# Patient Record
Sex: FEMALE | Race: WHITE
[De-identification: names, ages, dates, MRNs, and addresses within clinical notes are randomized per-mention and may not be internally consistent; named-entity substitution may affect disease eponyms.]

---

## 2017-01-31 ENCOUNTER — HOSPITAL ENCOUNTER (OUTPATIENT)
Dept: HOSPITAL 45 - C.MED | Age: 62
Setting detail: OBSERVATION
LOS: 1 days | Discharge: HOME | End: 2017-02-01
Attending: INTERNAL MEDICINE | Admitting: INTERNAL MEDICINE
Payer: SELF-PAY

## 2017-01-31 VITALS
HEIGHT: 58 IN | HEIGHT: 58 IN | BODY MASS INDEX: 42.02 KG/M2 | BODY MASS INDEX: 42.02 KG/M2 | WEIGHT: 200.18 LBS | WEIGHT: 200.18 LBS

## 2017-01-31 VITALS
DIASTOLIC BLOOD PRESSURE: 81 MMHG | OXYGEN SATURATION: 96 % | SYSTOLIC BLOOD PRESSURE: 144 MMHG | TEMPERATURE: 97.88 F | HEART RATE: 63 BPM

## 2017-01-31 DIAGNOSIS — E11.9: ICD-10-CM

## 2017-01-31 DIAGNOSIS — Z83.3: ICD-10-CM

## 2017-01-31 DIAGNOSIS — R07.89: Primary | ICD-10-CM

## 2017-01-31 DIAGNOSIS — Z95.5: ICD-10-CM

## 2017-01-31 DIAGNOSIS — Z82.49: ICD-10-CM

## 2017-01-31 DIAGNOSIS — Z88.2: ICD-10-CM

## 2017-01-31 DIAGNOSIS — Z79.4: ICD-10-CM

## 2017-01-31 DIAGNOSIS — Z88.0: ICD-10-CM

## 2017-01-31 DIAGNOSIS — Z79.899: ICD-10-CM

## 2017-01-31 DIAGNOSIS — J81.1: ICD-10-CM

## 2017-01-31 DIAGNOSIS — Z87.891: ICD-10-CM

## 2017-01-31 DIAGNOSIS — I25.2: ICD-10-CM

## 2017-01-31 DIAGNOSIS — E78.5: ICD-10-CM

## 2017-01-31 DIAGNOSIS — I25.10: ICD-10-CM

## 2017-01-31 DIAGNOSIS — Z95.1: ICD-10-CM

## 2017-01-31 DIAGNOSIS — Z88.5: ICD-10-CM

## 2017-01-31 DIAGNOSIS — Z79.01: ICD-10-CM

## 2017-01-31 DIAGNOSIS — I10: ICD-10-CM

## 2017-01-31 DIAGNOSIS — J44.9: ICD-10-CM

## 2017-01-31 RX ADMIN — METOPROLOL TARTRATE SCH MG: 50 TABLET, FILM COATED ORAL at 21:00

## 2017-01-31 NOTE — HISTORY & PHYSICAL EXAMINATION
DATE OF ADMISSION:  2017

 

PRIMARY CARE PHYSICIAN:  Dr. Chandra.

 

CHIEF COMPLAINT:  Chest pain.

 

HISTORY OF PRESENT ILLNESS:  Ms. Ovalles is a pleasant 61-year-old woman with a

history of severe coronary artery disease status post CABG and multiple prior

PCIs with stents, known to me; who returns from outside hospital ED with

chest pain.

 

The patient has a long cardiac history as discussed below.  She was recently

admitted to [facility] on 2 occasions 2 weeks ago.  Initial

hospitalization occurred in the setting of an NSTEMI when presented with a

troponin that peaked at approximately 3.6.  She underwent cardiac

catheterization at that time which showed a new 99% lesion in her OM1 distal to

previously placed stents.  She underwent PCTA

with a reasonable result but stent could not be placed due to difficult

anatomy.  Post-procedure, she was chest pain free, and was discharged to

home.  She returned the subsequent day with COPD

exacerbation.  She was treated with bronchodilators, steroids and

antibiotics, and discharged to home.  Since that time, she has largely been

well.  She has been walking around her home and outside without significant

chest discomfort.  Although she does feel that she is more weak than before.

 

Today, the day of admission, patient was at home, watching TV when felt a hot

burning sensation starting in her epigastric region which extended up towards

her head.  This was different than the pain that she has had before.  She was

concerned, took 1 nitroglycerin with minimal relief and called the EMS.  Upon

arriving in the [facility] ED, she received 1 dose of morphine and

her chest pain was resolved.  She had an EKG there, which showed normal sinus

rhythm with anterolateral T-wave inversions, unchanged from prior EKGs.  

She had 2 troponins that were negative.  As cardiology was not available at 

[place] she was transferred back here for further management.

 

At the time of arrival, patient was comfortable.  She denies any recurrent

chest pain.  She denies any shortness of breath, palpitations or presyncope. 

No other new complaints.

 

PAST MEDICAL HISTORY:

1.  Coronary artery disease.  The patient underwent prior 3-vessel bypass

surgery with LIMA to LAD, vein graft to ramus, vein graft to OM1.  She had an

NSTEMI back in 2016, at which point she was noted to have severe

OM1 and ramus disease with occluded vein graft to ramus and occluded vein

graft to circumflex.  She was transferred to [facility] where Dr. Mathews

placed drug-eluting stents to her OM, ramus and distal left main into her

proximal circumflex.  Post stents patient was

largely chest pain free until returned back to [facility] in 2017.

 There had a PTCA of OM1 with reasonable result.

2.  Type 2 diabetes.

3.  Hypertension.

4.  Prior tobacco abuse.

5.  Hyperlipidemia.

 

PAST SURGICAL HISTORY:

1.  Prior cardiac surgery as above.

2.  Status post hysterectomy.

3.  Status post appendectomy.

4.  Status post cholecystectomy.

5.  Status post tubal ligation.

 

FAMILY HISTORY:  She has a sister and a daughter both with diabetes.  Her

father and mother both had heart disease as well as her brother.  Mother 

of cancer at age 63.

 

SOCIAL HISTORY:  Long-term smoker, quit after her last hospitalization. 

Denies significant alcohol use.  Currently out of work.  Previously was a bus

.  Lives near [place].

 

REVIEW OF SYSTEMS:  A 10-point review of systems is completed and otherwise

negative unless stated in the HPI.

 

PHYSICAL EXAMINATION:

VITAL SIGNS:  The patient is afebrile.  Blood pressure 144/84, heart rate was

within normal range.

GENERAL:  The patient appears comfortable, in no acute distress.

HEENT:  Sclerae are anicteric.  Oropharynx is clear.  Mucous membranes are

moist.

NECK:  Supple, no lymphadenopathy.

LUNGS:  Clear to auscultation.  She had reasonable air movement with no

significant wheezing.

CARDIAC:  She had a regular rate and rhythm with no appreciable murmurs, rubs

or gallops.

ABDOMEN:  Soft, nontender, nondistended with positive bowel sounds.

EXTREMITIES:  Warm.  She had trace lower extremity edema.  She had intact

distal pulses.

SKIN:  Showed no rashes or lesions.

NEUROLOGIC:  Cranial nerves II-XII are grossly intact.

PSYCHIATRIC:  She was alert and oriented x3 and mood and was appropriate.

 

LABORATORY DATA:  At outside hospital showed white blood cell count 6.8,

hemoglobin of 12.4, platelets of 195.  Her INR was 0.96.  Her initial

troponin was 0.02.  Subsequent troponin was 0.022.  Sodium was 137, potassium

4.4, BUN 22, creatinine 1.2.  LFTs were within normal limits.

 

Chest x-ray showed cardiomegaly with mild edema.

 

EKG showed sinus bradycardia with anterior septal infarct and ST-T wave

abnormality, potentially consistent with anterolateral ischemia, unchanged

from prior.

 

IMPRESSION AND PLAN:

1.  Chest pain.  The patient is here with atypical chest pain, unlikely to be 
cardiac

in origin.  Cardiac enzymes have been negative x2.  EKG is unchanged.  Only

residual chest pain at this time, likely musculoskeletal.  We will continue

to treat conservatively.  We will repeat EKG tonight repeat cardiac enzymes

overnight.  Assuming cardiac enzymes remain unremarkable and patient remains

largely chest pain free, potentially could be discharged tomorrow with

cardiology followup.

2.  History of severe coronary artery disease.  Continue patient's

prior aspirin, Plavix, beta blocker, high dose statin.  She will need

outpatient followup and at that time will consider stress test if recurrent pain

to assess ischemic burden.

3.  Mild pulmonary edema.  We will resume patient on prior thiazide diuretic.

4.  Diabetes.  We will continue home diabetic regimen with insulin 70/30 and

Accu-Cheks.

5.  Chronic bronchitis.  Symptoms slowly resolving.  We will continue home

bronchodilators.

6.  Prophylaxis.  We will start her on subQ Lovenox.

 

DISPOSITION:  The patient likely discharged to home tomorrow assuming cardiac

enzymes and telemetry unremarkable.

 

 

 

MTDD

## 2017-02-01 VITALS
HEART RATE: 63 BPM | TEMPERATURE: 97.34 F | OXYGEN SATURATION: 96 % | SYSTOLIC BLOOD PRESSURE: 126 MMHG | DIASTOLIC BLOOD PRESSURE: 78 MMHG

## 2017-02-01 VITALS
DIASTOLIC BLOOD PRESSURE: 79 MMHG | TEMPERATURE: 97.7 F | HEART RATE: 60 BPM | OXYGEN SATURATION: 95 % | SYSTOLIC BLOOD PRESSURE: 124 MMHG

## 2017-02-01 VITALS — OXYGEN SATURATION: 96 %

## 2017-02-01 VITALS
OXYGEN SATURATION: 96 % | TEMPERATURE: 97.34 F | DIASTOLIC BLOOD PRESSURE: 78 MMHG | SYSTOLIC BLOOD PRESSURE: 126 MMHG | HEART RATE: 63 BPM

## 2017-02-01 VITALS
SYSTOLIC BLOOD PRESSURE: 100 MMHG | TEMPERATURE: 98.06 F | HEART RATE: 74 BPM | OXYGEN SATURATION: 94 % | DIASTOLIC BLOOD PRESSURE: 67 MMHG

## 2017-02-01 LAB
ANION GAP SERPL CALC-SCNC: 7 MMOL/L (ref 3–11)
BUN SERPL-MCNC: 19 MG/DL (ref 7–18)
BUN/CREAT SERPL: 17.3 (ref 10–20)
CALCIUM SERPL-MCNC: 8.9 MG/DL (ref 8.5–10.1)
CHLORIDE SERPL-SCNC: 109 MMOL/L (ref 98–107)
CO2 SERPL-SCNC: 28 MMOL/L (ref 21–32)
CREAT CL PREDICTED SERPL C-G-VRATE: 52.9 ML/MIN
CREAT SERPL-MCNC: 1.1 MG/DL (ref 0.6–1.2)
EOSINOPHIL NFR BLD AUTO: 151 K/UL (ref 130–400)
GLUCOSE SERPL-MCNC: 144 MG/DL (ref 70–99)
HCT VFR BLD CALC: 36.4 % (ref 37–47)
MCH RBC QN AUTO: 31.2 PG (ref 25–34)
MCHC RBC AUTO-ENTMCNC: 34.1 G/DL (ref 32–36)
MCV RBC AUTO: 91.5 FL (ref 80–100)
PMV BLD AUTO: 8.5 FL (ref 7.4–10.4)
POTASSIUM SERPL-SCNC: 4.1 MMOL/L (ref 3.5–5.1)
RBC # BLD AUTO: 3.98 M/UL (ref 4.2–5.4)
SODIUM SERPL-SCNC: 144 MMOL/L (ref 136–145)
WBC # BLD AUTO: 6.03 K/UL (ref 4.8–10.8)

## 2017-02-01 RX ADMIN — METOPROLOL TARTRATE SCH MG: 50 TABLET, FILM COATED ORAL at 08:39

## 2017-02-01 RX ADMIN — CYCLOBENZAPRINE HYDROCHLORIDE SCH MG: 10 TABLET, FILM COATED ORAL at 00:35

## 2017-02-01 RX ADMIN — CYCLOBENZAPRINE HYDROCHLORIDE SCH MG: 10 TABLET, FILM COATED ORAL at 08:39

## 2017-02-01 NOTE — DISCHARGE INSTRUCTIONS
Discharge Instructions


Admission


Reason for Admission:  Chest Pain





Discharge


Discharge Diagnosis / Problem:  Chest pain





Discharge Goals


Goal(s):  Decrease discomfort, Improve function





Activity Recommendations


Activity Limitations:  resume your previous activity


Lifting Limitations:  none


Exercise/Sports Limitations:  none


May Resume Sexual Activity:  when tolerated


Shower/Bathe:  no limitations


Driving or Machine Use:  no limitations





.





Instructions / Follow-Up


Instructions / Follow-Up


Follow-up with primary care in 1-2 weeks.





Follow-up with cardiology in 1 month





Current Hospital Diet


Patient's current hospital diet: AHA Diet (Heart Healthy), Diabetes Type 2 Diet





Discharge Diet


Recommended Diet:  AHA Diet (Heart Healthy), Diabetes Type 2 Diet





Procedures


Procedures Performed:  


None





Pending Studies


Studies pending at discharge:  no





Laboratory Results





 Hemoglobin A1c








Test


  1/11/17


10:15 Range/Units


 


 


Estimated Average Glucose 249   mg/dl


 


Hemoglobin A1c 10.3 H 4.5-5.6  %











Medical Emergencies








.


Who to Call and When:





Medical Emergencies:  If at any time you feel your situation is an emergency, 

please call 911 immediately.





.





Non-Emergent Contact


Non-Emergency issues call your:  Primary Care Provider, Cardiologist


Contact Number:  Cardiology  - Dr. Roman [phone]





.


.





"Provider Documentation" section prepared by Frank Roman.





VTE Core Measure


Inpt VTE Proph given/why not?:  Enoxaparin (Lovenox)SQ

## 2017-02-01 NOTE — DISCHARGE SUMMARY
Discharge Summary


Admission Date:


Jan 31, 2017 at 19:05


Discharge Date:  Feb 1, 2017


Discharge Disposition:  Home


Principal Diagnosis:  Chest Paini


Immunizations:  


   Have You Had Influenza Vaccine:  No


   History of Pneumococcal:  No


Procedures:


None


Consultations:


Cardiology


Medication Reconciliation


New Medications:  


Triamterene/Hctz (Dyazide 37.5MG/25MG)  Cap


1 CAP PO DAILY PRN for leg swelling for 30 Days, #30 CAP 3 Refills





Aspirin (Aspirin EC Low Dose) 81 Mg Ectab


81 MG PO QAM for 30 Days, #30 TAB 9 Refills





Pantoprazole (Pantoprazole Sodium) 40 Mg Tab


40 MG PO QAM for 30 Days, #30 TAB 9 Refills





 


Continued Medications:  


Albuterol Hfa (Ventolin Hfa) 200 Puffs/89724 Mcg Aers


2-4 PUFFS INH Q6H PRN for SOB/Wheezing, #1 INHALER





Atorvastatin (Lipitor) 40 Mg Tab


1 TAB PO HS for 90 Days, #90 TAB 3 Refills





Clopidogrel Bisulfate (Plavix) 75 Mg Tab


75 MG PO DAILY, #30 TAB


Take as directed.


Cyclobenzaprine Hcl (Flexeril) 10 Mg Tab


1 TAB PO TID for 10 Days, #30 TAB





Insulin Aspart 70/30 (Novolog Mix 70/30)  Susp


20 UNIT SC QAM, BTL





Insulin Aspart 70/30 (Novolog Mix 70/30)  Susp


12 UNIT SC QPM, BTL





Metoprolol Tartrate (Lopressor) 50 Mg Tab


1 TAB PO BID for 30 Days, #60 TAB


Take as directed


Nitroglycerin (Nitrostat) 0.4 Mg/1 Tab Subl


0.4 MG SL UD PRN for Chest Pain for 30 Days, #30 TAB


Take 1 tablet every 5 minutes for chest pain, up to 3 times. If pain


 not resolved call 911 or go to ER.


 


Discontinued Medications:  


Aspirin (Aspirin) 325 Mg Ectab


325 MG PO QAM for 30 Days, #30 TAB


Take as directed


Levofloxacin (Levofloxacin) 250 Mg Tab


250 MG PO DAILY for 5 Days, #5 TAB


Take for urinary tract infection starting 1/14/16, finish on 1/18/16.


Prednisone (Prednisone) 20 Mg Tab


2 TAB PO DAILY, #8 TAB











Discharge Exam


Physical Exam:  


   General Appearance:  WD/WN


   ENT:  hearing grossly normal, pharynx normal


   Neck:  supple, no JVD


   Respiratory/Chest:  lungs clear, normal breath sounds, no respiratory 

distress


   Cardiovascular:  regular rate, rhythm, no edema, no gallop, no JVD, + 

pertinent finding (Chest wall tender to palpation on left side lateral to 

sternum)


   Abdomen / GI:  normal bowel sounds, non tender, soft


   Extremities:  no pedal edema


   Neurologic/Psychiatric:  CNs II-XII nml as tested, alert, normal mood/affect


   Skin:  normal color, warm/dry, no rash





Hospital Course


Ms. Ovalles is a pleasant 61-year-old woman with a


history of severe coronary artery disease status post CABG and multiple prior


PCIs with stents who returned from outside hospital ED with


chest pain.





Patient with 2 recent hospitalizations at [facility].  Initially admitted with NSTEMI 

3 weeks ago.


Treated with PTCA of 99% OM1, stent could not be delivered.  Adequate POBA 

result.


Readmitted next day with COPD exacerbation.





Had been doing well until day of admission when developed burning epigastric 

pain which 


radiated up through her head.  Pain improved with 1 slntg at home, relieved 

completely with 


morphine in the ED.  Initial ECG, cardiac enzymes at [place] ED unremarkable.





Transferred to [facility] where cardiac enzymes remained negative, ecg/telemetry 

unremarkable.  She had mild left sided chest wall 


tenderness to palpation, worse with coughing but no other recurrent chest pain. 


Pain thought to be musculoskeletal vs possible GI and non-cardiac.  Will 

discharge on 


prior DAPT, and remainder of cardiac meds.  Added PPI and prior Dyazide for 

mild lower


extremity edema.





Patient will follow-up with her PCP in 1-2 weeks.


Follow-up with cardiology in 1-2 months.  If recurrent anginal pain in interim 

would consider stress test. 


Discussed case with Dr. Mathews at [facility].  May have additional 

interventional options if significant ischemia.


Total Time Spent:  Less than 30 minutes


This includes examination of the patient, discharge planning, medication 

reconciliation, and communication with other providers.





Discharge Instructions


Please refer to the electronic Patient Visit Report (Discharge Instructions) 

for additional information.





Follow-Up


PCP 1-2 weeks





Cardiology 1-2 months.

## 2017-02-01 NOTE — MEDICAL CONSULT
Consultation


Date of Consultation:


2017.


Attending Physician:


Ever Roman MD


History of Present Illness


60 y/o F w/Hx DM, HTN, HPL and severe CAD - transferred from OSH for cardiac 

evaluation due to ongoing CP.


She denies significant SOB, N/V, diaphoresis or radiation.  She is currently 

describing chest wall pain rather than substernal pain.





Past Medical/Surgical History


1) CAD


3V CABG (lima to LAD, v. to ramus, v. to OM1)


NSTEMI 2016 leading to cath and 2 stents due to graft occlusions


NSTEMI 2017 - PTCI of OM1





2) HTN





3) IDDM





4) Tobacco abuse - quit in last Mo





Family History





Diabetes mellitus


  SISTER, Onset:25's - 30 ( age 47)


  DAUGHTER, Onset:20's - 25


FH: brain cancer


  MOTHER ( age 63)


FH: heart attack


  FATHER


  MOTHER


FH: heart disease


  FATHER


  BROTHER


  BROTHER


Hypertension


  FATHER


  BROTHER ( age 67)


  BROTHER





Social History


Smoking Status:  Current Every Day Smoker


Drug Use:  none





Allergies


Coded Allergies:  


     Ampicillin (Verified  Allergy, Unknown, unknown, 17)


     Codeine (Verified  Allergy, Unknown, rash, 17)


     Sulfa Antibiotics (Verified  Allergy, Unknown, unknown, 17)





Current Inpatient Medications





 Current Inpatient Medications








 Medications


  (Trade)  Dose


 Ordered  Sig/Patrick


 Route  Start Time


 Stop Time Status Last Admin


Dose Admin


 


 Enoxaparin Sodium


  (Lovenox Inj)  40 mg  Q24H


 SC  17 06:00


 3/3/17 05:59   


 


 


 Acetaminophen


  (Tylenol Tab)  650 mg  Q4H  PRN


 PO  17 20:00


 3/2/17 19:59   


 


 


 Zolpidem Tartrate


  (Ambien Tab)  5 mg  HSZ  PRN


 PO  17 20:00


 3/2/17 19:59   


 


 


 Ondansetron HCl


  (Zofran Inj)  4 mg  Q6H  PRN


 IV  17 20:00


 3/2/17 19:59   


 


 


 Nitroglycerin


  (Nitrostat Tab)  0.4 mg  UD  PRN


 SL  17 20:00


 3/2/17 19:59   


 


 


 Albuterol


  (Ventolin Hfa


 Inhaler)  2 puffs  Q6H  PRN


 INH  17 20:00


 3/2/17 19:59   


 


 


 Atorvastatin


 Calcium


  (Lipitor Tab)  40 mg  HS


 PO  17 21:00


 3/2/17 20:59  17 00:30


40 MG


 


 Clopidogrel


 Bisulfate


  (plAVix TAB)  75 mg  DAILY


 PO  17 09:00


 3/3/17 08:59   


 


 


 Cyclobenzaprine


 HCl


  (Flexeril Tab)  10 mg  TID


 PO  17 21:00


 3/2/17 20:59  17 00:35


10 MG


 


 Insulin Aspart


 Prota 70%/Aspart


 30%


  (novoLOG MIX 70/


 30)  20 units  QAM


 SC  17 09:00


 3/3/17 08:59   


 


 


 Metoprolol


 Tartrate


  (Lopressor Tab)  50 mg  BID


 PO  17 21:00


 3/2/17 20:59   


 


 


 Aspirin


  (Ecotrin Tab)  81 mg  QAM


 PO  17 09:00


 3/3/17 08:59   


 


 


 Pantoprazole


 Sodium


  (Protonix Tab)  40 mg  QAM


 PO  17 09:00


 3/3/17 08:59   


 


 


 Miscellaneous


 Medication


  (Gi Cocktail)  24 ml  DAILY  PRN


 PO  17 20:00


 3/2/17 19:59 UNV  


 


 


 Insulin Aspart


 Prota 70%/Aspart


 30%


  (novoLOG MIX 70/


 30)  12 units  DAILY@1700


 SC  17 17:00


 3/3/17 16:59   


 











Review of Systems


Constitutional:  No chills, No fever, No sweats


Eyes:  No eye pain, No worsening of vision


ENT:  No hearing loss, No nasal symptoms, No unusual epistaxis


Respiratory:  No cough, No sputum, No wheezing


Cardiovascular:  + chest pain, No PND, No claudication, No edema, No orthopnea


Abdomen:  No nausea, No pain, No vomiting


Musculoskeletal:  No joint pain, No muscle pain


Genitourinary - Female:  No dysuria, No urinary frequency, No urinary urgency


Neurologic:  No memory loss, No paralysis, No weakness


Psychiatric:  No depression symptoms


Endocrine:  No fatigue


Hematologic / Lymphatic:  No abnormal bleeding/bruising


Integumentary:  No rash





Physical Exam











  Date Time  Temp Pulse Resp B/P Pulse Ox O2 Delivery O2 Flow Rate FiO2


 


17 00:26 36.7 74 20 100/67 94 Room Air  


 


17 00:05     96 Room Air  


 


1/31/17 19:00 36.6 63 18 144/81 96 Room Air  








General Appearance:  WD/WN, no apparent distress


Head:  normocephalic, atraumatic


Eyes:  normal inspection, PERRL, EOMI


ENT:  normal ENT inspection, pharynx normal


Neck:  supple, no adenopathy, thyroid normal, no JVD


Respiratory/Chest:  chest non-tender, lungs clear, normal breath sounds


Cardiovascular:  regular rate, rhythm, no edema, no gallop, no JVD, no murmur, 

normal peripheral pulses


Abdomen/GI:  normal bowel sounds, non tender, soft


Back:  normal inspection, no CVA tenderness, no muscle spasm, normal range of 

motion


Extremities/Musculoskelatal:  normal inspection, no calf tenderness, normal 

capillary refill, no pedal edema, normal range of motion


Neurologic/Psych:  CNs II-XII nml as tested, no motor/sensory deficits, alert, 

normal mood/affect, normal reflexes, oriented x 3


Skin:  normal color, warm/dry, no rash





Laboratory Results





Last 24 Hours








Test


  17


19:58 17


20:50


 


Creatine Kinase MB Ratio   


 


Creatine Kinase MB  < 0.5 ng/ml 


 


Troponin I  < 0.015 ng/ml 











Assessment & Plan


60 y/o F w/Hx DM, HTN, HPL and severe CAD - transferred from OSH for cardiac 

evaluation due to ongoing CP.








1) CP - pt was admitted by interventional cardiology - per admission there is 

no immediate plan for catheterization and she is being medically managed.  We 

will defer to cardiology for further testing.  Currently there has not been a 

troponin elevation to indicate an evolving MI and her pain is brought about by 

coughing appearing more as chest wall pain.  she will cont Plavix, ASA. statin. 

B blocker.





2) DM - placed on sliding scale





3) HTN - cont Metoprolol





4) HPL - Cont Atorvastatin








ttal time for this consult including chart review - review of attending notes, 

labs, prev cath report - 31 min

## 2018-08-17 ENCOUNTER — HOSPITAL ENCOUNTER (INPATIENT)
Dept: HOSPITAL 45 - C.2E | Age: 63
LOS: 7 days | Discharge: NURSING, REHAB OR LONG-TERM CARE | DRG: 286 | End: 2018-08-24
Attending: HOSPITALIST | Admitting: HOSPITALIST
Payer: COMMERCIAL

## 2018-08-17 VITALS
BODY MASS INDEX: 38.82 KG/M2 | BODY MASS INDEX: 38.82 KG/M2 | WEIGHT: 197.75 LBS | BODY MASS INDEX: 38.82 KG/M2 | HEIGHT: 60 IN | HEIGHT: 60 IN | WEIGHT: 197.75 LBS

## 2018-08-17 VITALS — OXYGEN SATURATION: 100 %

## 2018-08-17 VITALS
DIASTOLIC BLOOD PRESSURE: 51 MMHG | OXYGEN SATURATION: 96 % | TEMPERATURE: 97.52 F | HEART RATE: 90 BPM | SYSTOLIC BLOOD PRESSURE: 108 MMHG

## 2018-08-17 VITALS
DIASTOLIC BLOOD PRESSURE: 68 MMHG | HEART RATE: 80 BPM | OXYGEN SATURATION: 100 % | SYSTOLIC BLOOD PRESSURE: 112 MMHG | TEMPERATURE: 98.6 F

## 2018-08-17 VITALS
SYSTOLIC BLOOD PRESSURE: 112 MMHG | DIASTOLIC BLOOD PRESSURE: 68 MMHG | TEMPERATURE: 98.6 F | HEART RATE: 80 BPM | OXYGEN SATURATION: 100 %

## 2018-08-17 VITALS
OXYGEN SATURATION: 100 % | TEMPERATURE: 98.78 F | HEART RATE: 94 BPM | SYSTOLIC BLOOD PRESSURE: 121 MMHG | DIASTOLIC BLOOD PRESSURE: 72 MMHG

## 2018-08-17 VITALS — HEART RATE: 108 BPM | OXYGEN SATURATION: 100 %

## 2018-08-17 DIAGNOSIS — E66.01: ICD-10-CM

## 2018-08-17 DIAGNOSIS — Z95.1: ICD-10-CM

## 2018-08-17 DIAGNOSIS — Z95.5: ICD-10-CM

## 2018-08-17 DIAGNOSIS — N39.0: ICD-10-CM

## 2018-08-17 DIAGNOSIS — K21.9: ICD-10-CM

## 2018-08-17 DIAGNOSIS — I13.0: Primary | ICD-10-CM

## 2018-08-17 DIAGNOSIS — Z79.4: ICD-10-CM

## 2018-08-17 DIAGNOSIS — Z79.82: ICD-10-CM

## 2018-08-17 DIAGNOSIS — D64.9: ICD-10-CM

## 2018-08-17 DIAGNOSIS — Z82.49: ICD-10-CM

## 2018-08-17 DIAGNOSIS — D63.8: ICD-10-CM

## 2018-08-17 DIAGNOSIS — N18.3: ICD-10-CM

## 2018-08-17 DIAGNOSIS — E11.22: ICD-10-CM

## 2018-08-17 DIAGNOSIS — I24.8: ICD-10-CM

## 2018-08-17 DIAGNOSIS — I25.10: ICD-10-CM

## 2018-08-17 DIAGNOSIS — Z83.3: ICD-10-CM

## 2018-08-17 DIAGNOSIS — N76.89: ICD-10-CM

## 2018-08-17 DIAGNOSIS — Z87.891: ICD-10-CM

## 2018-08-17 DIAGNOSIS — I50.33: ICD-10-CM

## 2018-08-17 DIAGNOSIS — J45.909: ICD-10-CM

## 2018-08-17 DIAGNOSIS — J81.1: ICD-10-CM

## 2018-08-17 LAB
BUN SERPL-MCNC: 12 MG/DL (ref 7–18)
CALCIUM SERPL-MCNC: 8.2 MG/DL (ref 8.5–10.1)
CO2 SERPL-SCNC: 25 MMOL/L (ref 21–32)
CREAT SERPL-MCNC: 1.18 MG/DL (ref 0.6–1.2)
EOSINOPHIL NFR BLD AUTO: 440 K/UL (ref 130–400)
GLUCOSE SERPL-MCNC: 150 MG/DL (ref 70–99)
HCT VFR BLD CALC: 25.8 % (ref 37–47)
HGB BLD-MCNC: 8.1 G/DL (ref 12–16)
INR PPP: 1 (ref 0.9–1.1)
MCH RBC QN AUTO: 27.8 PG (ref 25–34)
MCHC RBC AUTO-ENTMCNC: 31.4 G/DL (ref 32–36)
MCV RBC AUTO: 88.7 FL (ref 80–100)
PMV BLD AUTO: 8.3 FL (ref 7.4–10.4)
POTASSIUM SERPL-SCNC: 4.3 MMOL/L (ref 3.5–5.1)
RED CELL DISTRIBUTION WIDTH CV: 16.9 % (ref 11.5–14.5)
RED CELL DISTRIBUTION WIDTH SD: 54.3 FL (ref 36.4–46.3)
SODIUM SERPL-SCNC: 137 MMOL/L (ref 136–145)
WBC # BLD AUTO: 15.3 K/UL (ref 4.8–10.8)

## 2018-08-17 RX ADMIN — Medication SCH ML: at 20:52

## 2018-08-17 RX ADMIN — VANCOMYCIN HYDROCHLORIDE SCH MG: 1 INJECTION, POWDER, LYOPHILIZED, FOR SOLUTION INTRAVENOUS at 20:52

## 2018-08-17 RX ADMIN — PRIMIDONE SCH MG: 50 TABLET ORAL at 20:54

## 2018-08-17 RX ADMIN — METOPROLOL TARTRATE SCH MG: 50 TABLET, FILM COATED ORAL at 20:54

## 2018-08-17 RX ADMIN — ATORVASTATIN CALCIUM SCH MG: 40 TABLET, FILM COATED ORAL at 20:54

## 2018-08-17 RX ADMIN — GABAPENTIN SCH MG: 100 CAPSULE ORAL at 20:54

## 2018-08-17 RX ADMIN — INSULIN ASPART SCH UNITS: 100 INJECTION, SOLUTION INTRAVENOUS; SUBCUTANEOUS at 20:56

## 2018-08-17 RX ADMIN — INSULIN GLARGINE SCH UNITS: 100 INJECTION, SOLUTION SUBCUTANEOUS at 20:56

## 2018-08-17 NOTE — DIAGNOSTIC IMAGING REPORT
SINGLE VIEW CHEST



CLINICAL HISTORY:  Congestive heart failure.



FINDINGS: An AP, portable, upright chest radiograph is compared to study dated

1/14/2017. The examination is degraded by portable technique and patient

rotation.  The patient is status post midline sternotomy. The heart is enlarged

and there is atherosclerotic calcification of the thoracic ureter. There is

pulmonary vascular congestion. No airspace consolidation or large pleural

effusion is identified. No pneumothorax is seen. The skeletal structures are

osteopenic. The bony thorax is grossly intact.



IMPRESSION: Cardiomegaly with evidence of mild congestive failure.







Electronically signed by:  Lui Jaimes M.D.

8/17/2018 6:33 PM



Dictated Date/Time:  8/17/2018 6:32 PM

## 2018-08-17 NOTE — HISTORY AND PHYSICAL
History & Physical


Date & Time of Service:


Aug 17, 2018 at 17:56


Chief Complaint:


Pulmonary Edema, Elevated Troponin


Primary Care Physician:


Sav Chandra D.O.


History of Present Illness


Source:  patient, hospital records (from [facility] and [facility] )


61yo female with history of CAD s/p CABG in , HTN, T2DM, morbid obesity, 

and long-standing asthma who presents as a transfer from [facility] for several concerns including chest tightness, elevated 

troponin, ongoing shortness of breath, and concerns of LE edema.  The patient 

was just released from [facility] in [place] this past Monday after a 

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  She required 

multiple surgeries for debridement, antibiotics, and ultimately placement of a 

wound vac.  During the latter portion of her stay she states she had been short 

of breath and this was treated with bronchodilators.  Upon discharge she states 

she was still short of breath.  On  of this week a home health nurse 

placed a wound vac on the right inner thigh wound.  Over the next 48 hours 

there were issues with getting a good seal on the right leg and it was leaking 

by report.  





Wednesday pm the patient recalls having shortness of breath at night-time and 

then had recurrent dyspnea along with chest tightness on Thursday.  She also 

reports having had severe bilateral LE edema ("my legs feel like lead") for 

some time.  She also complains of edema in her abdomen, arms, and right side of 

her face.  She went to [facility] Thursday afternoon for the above 

complaints.  She was admitted through the [place] ER to their telemetry 

unit.  The patient states the wound vac was removed due to persistent leaking.  





Records from [place] were reviewed and the following were found - 


1.  CTA chest was negative for PE but showed small b/l pleural effusions; no 

pneumonia seen


2.  b/l LE dopplers negative for DVT although the right leg was limited due to 

her wound


3.  EKG with NSR and no ST changes (my reading)


4.  ABG - 7.155/59.7/59/-8.4


5.  BNP 8792


6.  lactate 0.9


7.  Na 139, K 4.5, Creatinine 1, albumin 2.3


8.  Blood cx's sent


9.  CBC with WBC 10.5, Hb 9.5, platelets 664


10.  serial troponins - 0.057, 0.052, 0.060, 0.053 





During my initial assessment the patient complained of fatigue but confirmed 

her dyspnea was improved.


Records suggest she received at least 2 doses of IV lasix while at [facility].  


She denied any chest pain. 





Lastly, the patient mentions she was diagnosed with c. diff colitis while at 

[facility].  She cannot recall how long she has been on 

vancomycin.





Past Medical/Surgical History


PMH:


1.  CAD, s/p NSTEMI, s/p CABG in , s/p multiple stents in the past as well 


2.  T2DM


3.  HTN


4.  tobacco dependence - quit 2018


5.  hyperlipidemia


6.  morbid obesity 


7.  asthma since childhood 


8.  Rima's gangrene - inner right thigh - 2018


9.  c diff colitis - 2018 





PSH:


1.  b/l cataract extraction 


2.  multiple debridement surgeries for Rima's Gangrene


3.  s/p hysterectomy 


4.  s/p appendectomy 


5.  s/p cholecystectomy 


6.  s/p tubal ligation 


7.  CABG - 3-vessel -  (LIMA-LAD, vein graft to ramus, vein graft to OM1)





Family History





Diabetes mellitus


  SISTER, Onset:25's - 30 ( age 47)


  DAUGHTER, Onset:20's - 25


FH: brain cancer


  MOTHER ( age 63)


FH: heart attack


  FATHER


  MOTHER


FH: heart disease


  FATHER


  BROTHER


  BROTHER


Hypertension


  FATHER


  BROTHER ( age 67)


  BROTHER


father, mother - both had AIDS





Social History


Smoking Status:  Former Smoker (1 ppd x 50 years )


Smokeless Tobacco Use:  No


Alcohol Use:  none


Drug Use:  none


Marital Status:   (3 children)


Housing status:  lives with family (in [place])


Occupational Status:  retired ( )





Immunizations


History of Influenza Vaccine:  No


History of Pneumococcal:  No





Allergies


Coded Allergies:  


     Ampicillin (Verified  Allergy, Unknown, unknown, 17)


     Codeine (Verified  Allergy, Unknown, rash, 17)


     Sulfa Antibiotics (Verified  Allergy, Unknown, unknown, 17)





Home Medications


Scheduled


Aspirin (Aspirin EC Low Dose), 81 MG PO QAM


Atorvastatin (Lipitor), 1 TAB PO HS


Clopidogrel Bisulfate (Plavix), 75 MG PO DAILY


Cyclobenzaprine Hcl (Flexeril), 1 TAB PO TID


Insulin Aspart 70/30 (Novolog Mix 70/30), 20 UNIT SC QAM


Insulin Aspart 70/30 (Novolog Mix 70/30), 12 UNIT SC QPM


Metoprolol Tartrate (Lopressor), 1 TAB PO BID


Pantoprazole (Pantoprazole Sodium), 40 MG PO QAM





Scheduled PRN


Nitroglycerin (Nitrostat), 0.4 MG SL UD PRN for Chest Pain


Triamterene/Hctz (Dyazide 37.5MG/25MG), 1 CAP PO DAILY PRN for leg swelling





Review of Systems


Constitutional:  + chills, + sweats, + weakness (legs), + fatigue, No fever


Eyes:  No worsening of vision


ENT:  No nasal symptoms, No sore throat, No trouble swallowing


Respiratory:  + cough, + shortness of breath, + dyspnea on exertion, + dyspnea 

at rest, No sputum, No wheezing


Cardiovascular:  + chest pain, + orthopnea, + edema, No PND, No palpitations


Abdomen:  + diarrhea, No pain, No nausea, No vomiting, No GI bleeding


Musculoskeletal:  No joint pain


Genitourinary - Female:  No dysuria


Neurologic:  + numbness/tingling (feet)


Psychiatric:  No depression symptoms


Endocrine:  + fatigue


Hematologic / Lymphatic:  No abnormal bleeding/bruising


Integumentary:  + rash (dry skin )





Physical Exam


Vital Signs











  Date Time  Temp Pulse Resp B/P (MAP) Pulse Ox O2 Delivery O2 Flow Rate FiO2


 


18 16:08 37.0 80 20 112/68 100 Nasal Cannula 3.0 


 


18 16:00     100 Nasal Cannula 3.0 


 


18 15:58 37.0 80 20 112/68 (83) 100 Nasal Cannula 3.0 








General Appearance:  no apparent distress, + obese, + pertinent finding (looks 

tired)


Head:  normocephalic, atraumatic


Eyes:  PERRL (lens implants b/l )


ENT:  pharynx normal


Neck:  supple, no adenopathy, no JVD


Respiratory/Chest:  no respiratory distress, no accessory muscle use, + rales (

fine, both bases)


Cardiovascular:  regular rate, rhythm, no gallop, no murmur, normal peripheral 

pulses


Abdomen/GI:  normal bowel sounds, non tender, soft, no organomegaly, + 

pertinent finding (multiple scars abdominal wall )


Back:  normal inspection


Extremities/Musculoskelatal:  + pedal edema, + swelling (2-3+ on right 

extending to the thigh; 1-2+ on left also extending to at least the knee)


Neurologic/Psych:  no motor/sensory deficits (strength all muscle groups of b/l 

legs 5/5 ), alert, normal mood/affect, normal reflexes, oriented x 3


Skin:  + pertinent finding (no generalized rash; she does have dry skin in 

multiple locations; wound, right groin/upper right thigh anteriorly (chaperoned 

by female nursing staff) -- large wound taking up most of the right anterior 

superior thigh extending from the inguinal crease to the right labia majora and 

inferiorly to the perineal region; there are several intact sutures superiorly 

and inferiorly; the wound bed looks great with healthy pink granulation tissue 

on all surfaces; no drainage, no odor, no erythema )


Lymphatic:  no adenopathy (no cervical )





Diagnostics


Laboratory Results





Results Past 24 Hours








Test


  18


15:47 18


17:32 Range/Units


 


 


Bedside Glucose 182  70-90  mg/dl











Diagnostic Radiology


1.  CTA chest (done at [facility]) - 


no PE


no pneumonia


b/l pleural effusions (small)


dense coronary calcifications 





2.  b/l LE dopplers (done at [facility])


Negative for DVT b/l although right leg was suboptimally imaged 





3.  cxr - [facility] [place] - pending





EKG


my reading -


NSR, low voltage, anterior Q waves, no ST segment changes





Impression


Assessment and Plan


61yo female with history of CAD s/p CABG in , HTN, T2DM, morbid obesity, 

long-standing asthma, prior tobacco dependence (50 pack years), and recent month

-long hospitalization at [facility] in [place] for Rima'

s gangrene of the right thigh/groin who presents as a transfer from [facility] due to concerns for ACS and acute CHF.  








1.  question of ACS in the setting of known CAD - although her troponins are 

minimally elevated she does not report ischemic symptoms and EKG does not show 

ST changes.  During my admission assessment she was seen by Dr. Ever Roman from cardiology and he, too, does not feel she has had an ACS.  Her 

troponin elevation is likely demand ischemia possibly from acute CHF (see below)

. 





2.  acute CHF - suspect diastolic in nature.  She received perhaps 2 doses of 

IV lasix at the outside hospital and she is already improved from a pulmonary 

symptom standpoint.  She appears relatively euvolemic at the time of transfer 

to [facility].  Await chest x-ray.  Her o2 sats off oxygen are % at time 

of admission. 


Echo ordered.  





3.  edema - likely due to #2.  Cannot exclude hypothyroid state - awaiting TSH.

  Hypoalbuminemia likely contributing as her albumin is nearly 2. 


Since she already received lasix today will defer on additional doses and 

consider another dose tomorrow. 


Additionally, since the doppler of the RLE was suboptimal, will repeat the 

study here to exclude DVT given her recent prolonged hospitalization. 





4.  recent Rima's gangrene of right groin/thigh - unfortunately the Wound 

Care Team will not be available until this coming Monday.  However, Dr. Tanner Khan from surgery is willing to look at the patient's wound tomorrow and 

consider placing a wound vac on such if appropriate.  In meantime will place 

iodoform dressings in the wound bed and cover w/ sterile dressings.  





5.  T2DM - lantus 15 units at HS; novolog correction factor 30 and carb ratio 1:

10.  





6.  h/o asthma - suspect she likely has COPD due to long-standing tobacco 

dependence - not in exacerbation at this time.  Nebs/inhalers prn for symptoms. 





7.  morbid obesity - BMI 41.





8.  HTN - controlled; continue home medications. 





9.  CAD - continue BB, statin, asa, plavix. 





10.  hyperlipidemia - statin. 





11.  DVT proph - lovenox once daily. 





12.  abnormal ABG at [facility] earlier today - she had a mixed 

metabolic/respiratory acidosis at their facility.  Will repeat a VBG now.  





13.  hypoalbuminemia / protein calorie malnutrition - consider MVI, vit C, zinc

, and glucerna. 





14.  anemia - likely due to chronic disease - consider iron studies, b12, folate

, etc. 





15.  FEN - AHA/DM diet; saline lock; BMP am.





Advanced Directives


Existing Living Will:  No


Existing Power of :  No





Resuscitation Status


full code, level 1, but she would not want indefinite life-sustaining measures





VTE Prophylaxis


Will order VTE Prophylaxis:  Yes





Note


total time about 80 minutes





Additional Copies To


Sav Chandra D.O.; Ever Roman MD

## 2018-08-18 VITALS
TEMPERATURE: 98.24 F | SYSTOLIC BLOOD PRESSURE: 107 MMHG | OXYGEN SATURATION: 100 % | DIASTOLIC BLOOD PRESSURE: 52 MMHG | HEART RATE: 95 BPM

## 2018-08-18 VITALS
SYSTOLIC BLOOD PRESSURE: 128 MMHG | TEMPERATURE: 98.24 F | DIASTOLIC BLOOD PRESSURE: 68 MMHG | OXYGEN SATURATION: 97 % | HEART RATE: 99 BPM

## 2018-08-18 VITALS — HEART RATE: 94 BPM | OXYGEN SATURATION: 100 %

## 2018-08-18 VITALS — HEART RATE: 93 BPM | OXYGEN SATURATION: 96 %

## 2018-08-18 VITALS
OXYGEN SATURATION: 96 % | DIASTOLIC BLOOD PRESSURE: 67 MMHG | HEART RATE: 89 BPM | TEMPERATURE: 97.88 F | SYSTOLIC BLOOD PRESSURE: 124 MMHG

## 2018-08-18 VITALS
OXYGEN SATURATION: 100 % | TEMPERATURE: 98.24 F | HEART RATE: 98 BPM | DIASTOLIC BLOOD PRESSURE: 71 MMHG | SYSTOLIC BLOOD PRESSURE: 134 MMHG

## 2018-08-18 VITALS — OXYGEN SATURATION: 92 % | HEART RATE: 112 BPM

## 2018-08-18 VITALS — OXYGEN SATURATION: 91 % | HEART RATE: 111 BPM

## 2018-08-18 VITALS
OXYGEN SATURATION: 96 % | DIASTOLIC BLOOD PRESSURE: 67 MMHG | TEMPERATURE: 97.88 F | SYSTOLIC BLOOD PRESSURE: 117 MMHG | HEART RATE: 101 BPM

## 2018-08-18 VITALS
OXYGEN SATURATION: 100 % | HEART RATE: 84 BPM | SYSTOLIC BLOOD PRESSURE: 121 MMHG | TEMPERATURE: 98.24 F | DIASTOLIC BLOOD PRESSURE: 79 MMHG

## 2018-08-18 VITALS — OXYGEN SATURATION: 100 %

## 2018-08-18 VITALS — HEART RATE: 111 BPM | OXYGEN SATURATION: 100 %

## 2018-08-18 VITALS — OXYGEN SATURATION: 98 % | HEART RATE: 100 BPM

## 2018-08-18 LAB
BUN SERPL-MCNC: 12 MG/DL (ref 7–18)
CALCIUM SERPL-MCNC: 8.6 MG/DL (ref 8.5–10.1)
CO2 SERPL-SCNC: 26 MMOL/L (ref 21–32)
CREAT SERPL-MCNC: 1.08 MG/DL (ref 0.6–1.2)
EOSINOPHIL NFR BLD AUTO: 367 K/UL (ref 130–400)
GLUCOSE SERPL-MCNC: 201 MG/DL (ref 70–99)
HCT VFR BLD CALC: 26.8 % (ref 37–47)
HGB BLD-MCNC: 8.3 G/DL (ref 12–16)
MCH RBC QN AUTO: 28.1 PG (ref 25–34)
MCHC RBC AUTO-ENTMCNC: 31 G/DL (ref 32–36)
MCV RBC AUTO: 90.8 FL (ref 80–100)
NRBC BLD AUTO-RTO: 0.2 %
NUCLEATED RED BLOOD CELL ABS: 0.02 K/UL (ref 0–0)
PMV BLD AUTO: 8 FL (ref 7.4–10.4)
POTASSIUM SERPL-SCNC: 4.2 MMOL/L (ref 3.5–5.1)
RED CELL DISTRIBUTION WIDTH CV: 16.8 % (ref 11.5–14.5)
RED CELL DISTRIBUTION WIDTH SD: 55.6 FL (ref 36.4–46.3)
SODIUM SERPL-SCNC: 135 MMOL/L (ref 136–145)
TRANSFERRIN SERPL-MCNC: 239 MG/DL (ref 200–360)
WBC # BLD AUTO: 11.77 K/UL (ref 4.8–10.8)

## 2018-08-18 RX ADMIN — VANCOMYCIN HYDROCHLORIDE SCH MG: 1 INJECTION, POWDER, LYOPHILIZED, FOR SOLUTION INTRAVENOUS at 23:58

## 2018-08-18 RX ADMIN — PRIMIDONE SCH MG: 50 TABLET ORAL at 21:15

## 2018-08-18 RX ADMIN — METOPROLOL TARTRATE SCH MG: 50 TABLET, FILM COATED ORAL at 21:14

## 2018-08-18 RX ADMIN — VANCOMYCIN HYDROCHLORIDE SCH MG: 1 INJECTION, POWDER, LYOPHILIZED, FOR SOLUTION INTRAVENOUS at 12:54

## 2018-08-18 RX ADMIN — METOPROLOL TARTRATE SCH MG: 50 TABLET, FILM COATED ORAL at 08:18

## 2018-08-18 RX ADMIN — Medication SCH ML: at 05:33

## 2018-08-18 RX ADMIN — LACTOBACILLUS TAB SCH TAB: TAB at 17:33

## 2018-08-18 RX ADMIN — COLESTIPOL HYDROCHLORIDE SCH GM: 1 TABLET ORAL at 21:11

## 2018-08-18 RX ADMIN — ATORVASTATIN CALCIUM SCH MG: 40 TABLET, FILM COATED ORAL at 21:14

## 2018-08-18 RX ADMIN — GABAPENTIN SCH MG: 100 CAPSULE ORAL at 21:15

## 2018-08-18 RX ADMIN — FERROUS SULFATE TAB EC 325 MG (65 MG FE EQUIVALENT) SCH MG: 325 (65 FE) TABLET DELAYED RESPONSE at 21:11

## 2018-08-18 RX ADMIN — VANCOMYCIN HYDROCHLORIDE SCH MG: 1 INJECTION, POWDER, LYOPHILIZED, FOR SOLUTION INTRAVENOUS at 17:36

## 2018-08-18 RX ADMIN — VANCOMYCIN HYDROCHLORIDE SCH MG: 1 INJECTION, POWDER, LYOPHILIZED, FOR SOLUTION INTRAVENOUS at 05:33

## 2018-08-18 RX ADMIN — Medication SCH ML: at 23:58

## 2018-08-18 RX ADMIN — VANCOMYCIN HYDROCHLORIDE SCH MG: 1 INJECTION, POWDER, LYOPHILIZED, FOR SOLUTION INTRAVENOUS at 00:15

## 2018-08-18 RX ADMIN — IPRATROPIUM BROMIDE AND ALBUTEROL SULFATE SCH ML: .5; 3 SOLUTION RESPIRATORY (INHALATION) at 06:43

## 2018-08-18 RX ADMIN — IPRATROPIUM BROMIDE AND ALBUTEROL SULFATE SCH ML: .5; 3 SOLUTION RESPIRATORY (INHALATION) at 10:46

## 2018-08-18 RX ADMIN — Medication SCH ML: at 12:54

## 2018-08-18 RX ADMIN — INSULIN ASPART SCH UNITS: 100 INJECTION, SOLUTION INTRAVENOUS; SUBCUTANEOUS at 16:53

## 2018-08-18 RX ADMIN — INSULIN ASPART SCH UNITS: 100 INJECTION, SOLUTION INTRAVENOUS; SUBCUTANEOUS at 21:22

## 2018-08-18 RX ADMIN — FERROUS SULFATE TAB EC 325 MG (65 MG FE EQUIVALENT) SCH MG: 325 (65 FE) TABLET DELAYED RESPONSE at 10:40

## 2018-08-18 RX ADMIN — POTASSIUM CHLORIDE SCH MEQ: 1500 TABLET, EXTENDED RELEASE ORAL at 21:14

## 2018-08-18 RX ADMIN — Medication SCH MG: at 08:18

## 2018-08-18 RX ADMIN — Medication SCH CAN: at 17:19

## 2018-08-18 RX ADMIN — INSULIN GLARGINE SCH UNITS: 100 INJECTION, SOLUTION SUBCUTANEOUS at 21:21

## 2018-08-18 RX ADMIN — POTASSIUM CHLORIDE SCH MEQ: 1500 TABLET, EXTENDED RELEASE ORAL at 10:40

## 2018-08-18 RX ADMIN — CLOPIDOGREL BISULFATE SCH MG: 75 TABLET, FILM COATED ORAL at 08:17

## 2018-08-18 RX ADMIN — INSULIN ASPART SCH UNITS: 100 INJECTION, SOLUTION INTRAVENOUS; SUBCUTANEOUS at 12:49

## 2018-08-18 RX ADMIN — COLESTIPOL HYDROCHLORIDE SCH GM: 1 TABLET ORAL at 08:18

## 2018-08-18 RX ADMIN — LACTOBACILLUS TAB SCH TAB: TAB at 08:17

## 2018-08-18 RX ADMIN — IPRATROPIUM BROMIDE AND ALBUTEROL SULFATE SCH ML: .5; 3 SOLUTION RESPIRATORY (INHALATION) at 13:56

## 2018-08-18 RX ADMIN — LACTOBACILLUS TAB SCH TAB: TAB at 12:53

## 2018-08-18 RX ADMIN — Medication SCH ML: at 17:33

## 2018-08-18 RX ADMIN — INSULIN ASPART SCH UNITS: 100 INJECTION, SOLUTION INTRAVENOUS; SUBCUTANEOUS at 07:00

## 2018-08-18 RX ADMIN — COLESTIPOL HYDROCHLORIDE SCH GM: 1 TABLET ORAL at 00:15

## 2018-08-18 RX ADMIN — PANTOPRAZOLE SCH MG: 40 TABLET, DELAYED RELEASE ORAL at 08:17

## 2018-08-18 RX ADMIN — ENOXAPARIN SODIUM SCH MG: 40 INJECTION SUBCUTANEOUS at 00:15

## 2018-08-18 RX ADMIN — ENOXAPARIN SODIUM SCH MG: 40 INJECTION SUBCUTANEOUS at 21:16

## 2018-08-18 RX ADMIN — IPRATROPIUM BROMIDE AND ALBUTEROL SULFATE SCH ML: .5; 3 SOLUTION RESPIRATORY (INHALATION) at 15:44

## 2018-08-18 RX ADMIN — Medication SCH ML: at 00:15

## 2018-08-18 RX ADMIN — IPRATROPIUM BROMIDE AND ALBUTEROL SULFATE SCH ML: .5; 3 SOLUTION RESPIRATORY (INHALATION) at 19:22

## 2018-08-18 NOTE — ECHOCARDIOGRAM REPORT
*NOTICE TO RECEIVING PARTY AGENCY**  This information is strictly Confidential and protected under 
[place] law.  [place] law prohibits you from making any further disclosure of this 
information unless further disclosure is expressly permitted by the written consent of the person to 
whom it pertains or is authorized by law.  A general authorization for the release of medical or 
other information is not sufficient for this purpose.  Hospital accepts no responsibility if the 
information is made available to any other person, INCLUDING THE PATIENT.



Interpretation Summary

  *  Name: EDSON GARCIA  Study Date: 2018 07:36 AM  BP: 128/68 mmHg

  *  MRN: [id number]  Patient Location: C.2E\S\E211\S\1  HR: 95

  *  : 1955 (M/d/yyyy)  Gender: Female  Height: 60 in

  *  Age: 62 yrs  Ethnicity: [place]  Weight: 214 lb

  *  Ordering Physician: Juan Mcarthur

  *  Referring Physician: Self, Referred

  *  Performed By: Brandi Xion[facility]

  *  Accession# [id number]  Account# [id number]

  *  Reason For Study: CHF

  *  BSA: 1.9 m2

  *  -- Conclusions --

  *  1. Normal left ventricular size. Moderately reduced systolic function. Estimated EF 35%.  
Global hypokinesis.  No left ventricular hypertrophy.  Type 2 diastolic dysfunction.

  *  2. Grossly normal right ventricular size with mildly reduced systolic function.

  *  3. There is mild mitral regurgitation.

  *  4. Normal estimated right ventricular systolic pressure; 33mmHg.

  *  5. Technically difficult study.  IV echo contrast may enhance study and allow for better 
interpretation of wall motion and LV systolic function.

  *  6. No prior study available for comparison.

Procedure Details

  *  A complete two-dimensional transthoracic echocardiogram was performed (2D, M-mode, Doppler and 
color flow Doppler).

Left Ventricle

  *  Normal left ventricular size. Moderately reduced systolic function. Estimated EF 35%.  Global 
hypokinesis.  No left ventricular hypertrophy.  Type 2 diastolic dysfunction.

Right Ventricle

  *  The right ventricle is not well visualized.

  *  The right ventricle is grossly normal size.

  *  The right ventricular systolic function is reduced as assessed by tricuspid annular plane 
systolic excursion (TAPSE) (TAPSE <1.6 cm).

Atria

  *  The left atrial size is normal.

  *  Right atrial size is normal.

  *  There is no evidence of atrial septal defect, but resolution does not allow assessment for a 
patent foramen ovale.

Mitral Valve

  *  The mitral valve is grossly normal.

  *  There is no mitral valve stenosis.

  *  There is mild mitral regurgitation.

Tricuspid Valve

  *  The tricuspid valve is not well visualized.

  *  There is no tricuspid stenosis.

  *  There is trace tricuspid regurgitation.

Aortic Valve

  *  The aortic valve opens well.

  *  No hemodynamically significant valvular aortic stenosis.

  *  There is no significant aortic regurgitation.

Pulmonic Valve

  *  The pulmonary valve is inadequately visualized, but the Doppler data is adequate for 
interpretation.

  *  There is no pulmonic valvular stenosis.

  *  There is no significant pulmonary regurgitation.

Great Vessels

  *  The aortic root is normal size.

  *  Aortic arch of normal dimension.

Pericardium/Pleural

  *  There is no pericardial effusion.

Great Vessels

  *  Normal inferior vena cava size and collapsability with sniff indicates a normal right atrial 
pressure of 3 mmHg



MMode 2D Measurements and Calculations

IVSd 1.1 cm



LVIDd 4.9 cm

LVIDs 4.0 cm

LVPWd 1.1 cm



IVS/LVPW 0.99 

FS 18.9 %

EDV(Teich) 112.1 ml

ESV(Teich) 68.5 ml

EF(Teich) 38.9 %



EDV(cubed) 116.7 ml

ESV(cubed) 62.3 ml

EF(cubed) 46.6 %





LV mass(C)d 190.0 grams

LV mass(C)dI 98.9 grams/m\S\2



SV(Teich) 43.6 ml

SI(Teich) 22.7 ml/m\S\2

SV(cubed) 54.4 ml

SI(cubed) 28.3 ml/m\S\2



Ao root diam 2.9 cm

Ao root area 6.8 cm\S\2

ACS 1.8 cm

LA dimension 3.1 cm



LA/Ao 1.1 

LVOT diam 2.0 cm

LVOT area 3.0 cm\S\2





LVAd ap4 33.5 cm\S\2

LVLd ap4 7.5 cm

EDV(MOD-sp4) 120.5 ml

EDV(sp4-el) 126.2 ml

LVAs ap4 25.2 cm\S\2

LVLs ap4 6.8 cm

ESV(MOD-sp4) 75.6 ml

ESV(sp4-el) 79.6 ml

EF(MOD-sp4) 37.2 %

EF(sp4-el) 36.9 %



LVAd ap2 33.2 cm\S\2

LVLd ap2 8.2 cm

EDV(MOD-sp2) 108.6 ml

EDV(sp2-el) 114.1 ml

LVAs ap2 26.8 cm\S\2

LVLs ap2 7.7 cm

ESV(MOD-sp2) 75.2 ml

ESV(sp2-el) 79.1 ml

EF(MOD-sp2) 30.7 %

EF(sp2-el) 30.7 %



LVLd %diff 7.8 %

EDV(MOD-bp) 120.2 ml

LVLs %diff 12.2 %

ESV(MOD-bp) 80.5 ml

EF(MOD-bp) 33.0 %



SV(MOD-sp4) 44.9 ml

SI(MOD-sp4) 23.3 ml/m\S\2





SV(MOD-sp2) 33.4 ml

SI(MOD-sp2) 17.4 ml/m\S\2



SV(MOD-bp) 39.6 ml

SI(MOD-bp) 20.6 ml/m\S\2



SV(sp4-el) 46.6 ml

SI(sp4-el) 24.3 ml/m\S\2



SV(sp2-el) 35.0 ml

SI(sp2-el) 18.2 ml/m\S\2







Doppler Measurements and Calculations

MV E max leonardo 112.2 cm/sec

MV A max leonardo 63.1 cm/sec



MV E/A 1.8 



MV P1/2t max leonardo 125.1 cm/sec

MV P1/2t 49.2 msec

MVA(P1/2t) 4.5 cm\S\2

MV dec slope 745.4 cm/sec\S\2

MV dec time 0.17 sec



Ao V2 max 127.1 cm/sec

Ao max PG 6.5 mmHg

Ao max PG (full) 3.3 mmHg

CRISTA(V,A) 2.1 cm\S\2

CRISTA(V,D) 2.1 cm\S\2





LV V1 max PG 3.2 mmHg



LV V1 max 89.0 cm/sec



PA V2 max 85.4 cm/sec

PA max PG 2.9 mmHg



TR max leonardo 272.2 cm/sec

RVSP(TR) 32.6 mmHg





RAP systole 3.0 mmHg

## 2018-08-18 NOTE — SURGICAL CONSULTATION
DATE OF CONSULTATION:  2018

 

REFERRING PHYSICIAN:  Juan Mcarthur MD

 

REASON FOR CONSULTATION:  Right thigh and inguinal wound.

 

HISTORY OF PRESENT ILLNESS:  Ms. Ovalles is a 62-year-old female with multiple

issues including coronary artery disease and coronary artery bypass, who

developed Rima gangrene, was hospitalized for a month at [facility].  She was just discharged a few days ago to her

home in [place] and has a wound VAC on her right thigh.  She became more

short of breath and was complaining of some chest tightness.  She also had

bilateral lower extremity edema.  She went to [facility] but was

transferred here for more intensive management.  She underwent a CT of the

chest, which showed no evidence of pulmonary embolism.  She had no evidence

of deep vein thrombosis, with a suboptimal study.  She states that her

breathing has improved.  When I saw her this morning, she had just gotten off

her CPAP.  Her  was at the bedside.

 

PAST MEDICAL HISTORY:

1.  Recent bout of Clostridium difficile colitis.

2.  Rima's gangrene, right thigh.

3.  Morbid obesity.

4.  History of cigarette smoking, quit recently.

5.  Hyperlipidemia.

6.  Coronary artery disease.

7.  Hypertension.

8.  Diabetes mellitus.

 

PAST SURGICAL HISTORY:

1.  Percutaneous transluminal angioplasty.

2.  Coronary artery bypass grafting.

3.  Bilateral cataract extraction.

4.  Multiple debridements for Rima's gangrene, right thigh.

5.  Appendectomy.

6.  Cholecystectomy.

7.  Hysterectomy.

8.  Tubal ligation.

 

MEDICATIONS:  Multiple, please see chart.

 

ALLERGIES:  INCLUDE AMPICILLIN, CODEINE, AND SULFA.

 

SOCIAL HISTORY:  Patient lives at [place].  Smoked a pack of cigarettes a

day, states she just quit last month.  She does not use alcohol.  She lives

with her .  She is a retired .

 

MEDICATIONS:  Patient has 3 children who are healthy.

 

FAMILY MEDICAL HISTORY:  Her mother and father both had acquired

immunodeficiency syndrome.  She had a sister who  at age 45 and had

diabetes mellitus.  She does have a daughter who has early onset diabetes in

her 20s.  Her mother  at age 63.  She had a brother who  at age 67. 

Father and her brother had hypertension and coronary artery disease.

 

REVIEW OF SYSTEMS:  Please see the history of present illness.  The patient

has complained of increased edema and increased shortness of breath with her

legs feeling "heavy."  She has also been fatigued, has had some night sweats.

 She has had no visual or auditory changes and denies pharyngeal symptoms. 

She does have chest pain with orthopnea, with tightness as described in the

history of present illness.  She does have edema of her lower extremities,

which has worsened.  She complained of some diarrhea but did have recent

Clostridium difficile colitis and is on p.o. vancomycin.  She denies vomiting

or nausea.  She denies joint swelling or pain in her extremities except for

her right thigh of course.  She denies palpitations.  She has had no

productive cough.  She has been short of breath.  She has had no focal

deficits neurologically.

 

Her right thigh was inspected.  This wound in her right groin has good

granulation tissue with some biofilm.  There are only a couple of sutures

that are intact.  This goes down to her perineal area close to her outer

labial majora on the right, also gets close to her perirectal area although

not into the perirectal tissue itself.  She also has diarrhea and has soiled

a portion of this wound.  Currently, there is iodoform gauze in her wound. 

She has no surrounding erythema or fluctuance.  She really does not have that

much pain with it either.

 

Neurologically, patient is moving all extremities.  She is slow to respond.

 

ASSESSMENT AND PLAN:  Status post multiple debridements and incisions and

wound VAC therapy for her right Rima's gangrene.  They did not get a good

seal, which is not surprising given the patient's size.  Quite frankly, I

think that this wound is getting cleaned enough to consider a skin graft.  At

this point, I am going to switch her over to silver alginate.  A wound VAC is

going to be difficult due to its conformational issues being it close to the

rectum and the vagina.  We will get the silver alginate to help.  We can get

the wound care service to apply wound VAC; however, I think a silver alginate

would be a good wound dressing at this point.

## 2018-08-18 NOTE — PROGRESS NOTE
Subjective


Date of Service:


Aug 18, 2018.


Subjective


Pt evaluation today including:  conversation w/ patient, conversation w/ family

, physical exam, chart review, lab review, review of studies (records from [facility] 

reviewed; cxr reviewed), conversation w/ consultant (cardiology - Dr. Roman), 

review of inpatient medication list


Pain:  no pain


Voiding:  goodwin catheter in place


tele without dysrhythmia overnight





she had restless night with dyspnea and PND


required oxymask and received ativan for anxiety 


received a prn duoneb with no appreciable effect


troponins rechecked - mildly elevated but they were also elevated in [place]





CTA chest done in [place] earlier this month with b/l effusions & edema c/w 

pulmonary edema 





she c/o fatigue 


no chest pain 


no cough





Review of Systems


Constitutional:  No fever, No chills


Respiratory:  + shortness of breath, + dyspnea at rest, No cough


Cardiac:  + orthopnea, + PND, + edema, No chest pain


Abdomen:  + diarrhea, No pain, No GI bleeding





Objective


Vital Signs











  Date Time  Temp Pulse Resp B/P (MAP) Pulse Ox O2 Delivery O2 Flow Rate FiO2


 


8/18/18 08:00     100 Nasal Cannula 3.0 


 


8/18/18 07:19 36.8 99 22 128/68 (88) 97 BiPAP  


 


8/18/18 07:04  112   92   35


 


8/18/18 06:43  111 32  91 Room Air  


 


8/18/18 03:30 36.6 89 18 124/67 (86) 96 Room Air  


 


8/17/18 23:03 37.1 94 20 121/72 (88) 100 Oxymask 10.0 


 


8/17/18 22:18  108 28  100 Mask 15.0 


 


8/17/18 20:52      Room Air  


 


8/17/18 19:03 36.4 90 20 108/51 (70) 96 Room Air  


 


8/17/18 16:08 37.0 80 20 112/68 100 Nasal Cannula 3.0 


 


8/17/18 16:00     100 Nasal Cannula 3.0 


 


8/17/18 15:58 37.0 80 20 112/68 (83) 100 Nasal Cannula 3.0 











Physical Exam


General Appearance:  no apparent distress, + obese, + pertinent finding (tired 

appearing but alert & able to answer questions)


ENT:  pharynx normal


Neck:  no JVD


Respiratory/Chest:  no respiratory distress, no accessory muscle use, + crackles

 (both bases), + pertinent finding (no wheezing)


Cardiovascular:  regular rate, rhythm, no gallop, no murmur


Abdomen:  normal bowel sounds, non tender, soft, no organomegaly


Extremities:  + pedal edema, + swelling (Right leg worse than left leg, no 

change from prior exam)


Neurologic/Psychiatric:  alert, oriented x 3


Skin:  + pallor, + pertinent finding (large dressing intact to right upper 

thigh )





Laboratory Results





Last 24 Hours








Test


  8/17/18


15:47 8/17/18


19:12 8/17/18


19:13 8/17/18


19:24


 


Bedside Glucose 182 mg/dl    


 


Prothrombin Time  10.5 SECONDS   


 


Prothromb Time International


Ratio 


  1.0 


  


  


 


 


Sodium Level  137 mmol/L   


 


Potassium Level  4.3 mmol/L   


 


Chloride Level  101 mmol/L   


 


Carbon Dioxide Level  25 mmol/L   


 


Anion Gap  11.0 mmol/L   


 


Blood Urea Nitrogen  12 mg/dl   


 


Creatinine  1.18 mg/dl   


 


Est Creatinine Clear Calc


Drug Dose 


  52.9 ml/min 


  


  


 


 


Estimated GFR (


American) 


  58.1 


  


  


 


 


Estimated GFR (Non-


American 


  50.1 


  


  


 


 


BUN/Creatinine Ratio  9.9   


 


Random Glucose  150 mg/dl   


 


Calcium Level  8.2 mg/dl   


 


Thyroid Stimulating Hormone


(TSH) 


  4.670 uIu/ml 


  


  


 


 


Chemistry Specimen Hemolysis     


 


Venous Blood pH   7.46  


 


Venous Blood Partial Pressure


CO2 


  


  39 mmHg 


  


 


 


Venous Blood Partial Pressure


O2 


  


  27 mmHg 


  


 


 


Venous Blood HCO3   27 mmol/L  


 


Venous Blood Oxygen Saturation   < 60.0 %  


 


Venous Blood Base Excess   3.3 mEq/L  


 


White Blood Count    15.30 K/uL 


 


Red Blood Count    2.91 M/uL 


 


Hemoglobin    8.1 g/dL 


 


Hematocrit    25.8 % 


 


Mean Corpuscular Volume    88.7 fL 


 


Mean Corpuscular Hemoglobin    27.8 pg 


 


Mean Corpuscular Hemoglobin


Concent 


  


  


  31.4 g/dl 


 


 


RDW Standard Deviation    54.3 fL 


 


RDW Coefficient of Variation    16.9 % 


 


Platelet Count    440 K/uL 


 


Mean Platelet Volume    8.3 fL 


 


Test


  8/17/18


20:35 8/17/18


22:29 8/17/18


23:01 8/18/18


05:32


 


Bedside Glucose 176 mg/dl    


 


Troponin I  0.117 ng/ml   0.092 ng/ml 


 


Arterial Blood pH   7.36  


 


Arterial Blood Partial


Pressure CO2 


  


  44 mmHg 


  


 


 


Arterial Blood Partial


Pressure O2 


  


  142 mm/Hg 


  


 


 


Arterial Blood HCO3   24 mmol/L  


 


Arterial Blood Oxygen


Saturation 


  


  98.2 % 


  


 


 


Arterial Blood Base Excess   -1.1 mEq/L  


 


Arterial Blood Gas Delivery   11 L  


 


Dante Test   POS  


 


White Blood Count    11.77 K/uL 


 


Red Blood Count    2.95 M/uL 


 


Hemoglobin    8.3 g/dL 


 


Hematocrit    26.8 % 


 


Mean Corpuscular Volume    90.8 fL 


 


Mean Corpuscular Hemoglobin    28.1 pg 


 


Mean Corpuscular Hemoglobin


Concent 


  


  


  31.0 g/dl 


 


 


RDW Standard Deviation    55.6 fL 


 


RDW Coefficient of Variation    16.8 % 


 


Platelet Count    367 K/uL 


 


Mean Platelet Volume    8.0 fL 


 


Nucleated RBC Absolute Count


(auto) 


  


  


  0.02 K/uL 


 


 


Nucleated Red Blood Cells %    0.2 % 


 


Iron Level    35 mcg/dl 


 


Transferrin    239 mg/dl 


 


Transferrin % Saturation    10 % 


 


Ferritin    80.1 ng/ml 


 


Vitamin B12 Level    1283 pg/mL 


 


Folate    19.54 ng/mL 


 


Free Thyroxine    1.15 ng/dl 


 


Test


  8/18/18


07:16 


  


  


 


 


Bedside Glucose 169 mg/dl    











Assessment and Plan


63yo female with history of CAD s/p CABG in 2000, HTN, T2DM, morbid obesity, 

long-standing asthma, prior tobacco dependence (50 pack years), and recent month

-long hospitalization at [facility] in [place] for Rima'

s gangrene of the right thigh/groin who presents as a transfer from [facility] due to concerns for ACS and acute CHF.  








1.  question of ACS in the setting of known CAD - had mild troponin elevation 

at [facility], and troponins were repeated here overnight with peak of 

0.1.  


I believe the troponin elevation is from demand ischemia rather than ACS.  

Demand ischemia is likely from acute CHF.  Dr. Roman to see.


Will continue her chronic CAD meds including BB, asa, plavix, statin, etc. 





2.  acute CHF - suspect diastolic in nature.  Ongoing clinically and 

radiographically but, oddly, she is able to lay flat fairly comfortably this am 

and o2 sats without supplemental o2 are high 90s.  none-the-less gave lasix 

20mg IV x 1 this AM with already good diuresis.  Will give additional 40mg this 

afternoon.  Pt reports her dry weight is about 200 pounds. 


Current weight is 213 pounds.  


Fluid restrict to 1500cc/day.  


Try to improve blood albumin level w/ supplements.  


Await formal echo reading. 


Ok to use BIPAP at HS - this may help her rest better and prevent PND.





3.  edema - likely due to #2.  Edema is assymetric - worse on right.  Dopplers 

at [place] were neg for DVT although right leg was suboptimally imaged.  

Awaiting repeat doppler here. 





4.  recent Rima's gangrene of right groin/thigh - unfortunately the Wound 

Care Team will not be available until this coming Monday.  In meantime will 

place iodoform dressings in the wound bed and cover w/ sterile dressings.  Dr. Khan's consult appreciated.  Add MVI/zinc/Vit C to help promoted wound 

healing.  Will add boost glucose control as well. 





5.  T2DM - lantus 15 units at HS; novolog correction factor 30 and carb ratio 1:

10.  Controlled. 





6.  h/o asthma - suspect she likely has COPD due to long-standing tobacco 

dependence - not in exacerbation at this time.  Nebs/inhalers prn for symptoms.

  I believe her current pulmonary symptoms are from CHF and not COPD/asthma. 





7.  morbid obesity - BMI 41.





8.  HTN - controlled; continue home medications. 





9.  CAD - continue BB, statin, asa, plavix.  See discussion above. 





10.  hyperlipidemia - statin. 





11.  DVT proph - lovenox once daily. 





12.  abnormal ABG at [facility] earlier today - repeat gases here are 

acceptable. 





13.  hypoalbuminemia / protein calorie malnutrition - MVI, vit C, zinc, and 

boost glucose control. 





14.  anemia - likely due to chronic disease - b12, folate wnl.  Iron studies 

most c/w ACD but could have low grade Fe def.  Will supplement.  


If Hb drops to <8 consider transfusion.  





15.  FEN - AHA/DM diet; saline lock; BMP at noon today and tomorrow am.  Fluid 

restrict.  





16.  low-normal BPs at times - check cortisol level.  





17.  c diff colitis - cont vancomycin; records from [facility] state her 

stop date is 8/24/18.  Cont lactinex TID.  added colestipol 1gm BID.  


Contract precautions. 





PT, OT evals


 updated 


oob to chair BID


Continued [facility] stay due to:  ambulation difficulties, multiple IV medications 

needed


Discharge planning:  uncertain

## 2018-08-18 NOTE — CARDIOLOGY CONSULTATION
DATE OF CONSULTATION:  08/18/2018

 

CONSULTATION REQUESTED BY:  Juan Mcarthur MD

 

REASON FOR CONSULTATION:  Elevated troponin, cardiomyopathy, multivessel

coronary artery disease.

 

HISTORY OF PRESENT ILLNESS:  This is a 62-year-old woman known to me from

prior hospitalizations in the outpatient setting with a complex past medical

history including multivessel coronary artery disease status post remote CABG

and multiple PCI who was transferred from [facility]

yesterday in the setting of suspected heart failure and minimally elevated

troponin.

 

The patient was recently hospitalized at [facility] for close to a month in the

setting of Rima gangrene of her right thigh/groin requiring multiple

surgical debridements, IV antibiotics, and complicated by C. diff infection. 

She has been home with a wound VAC in place and earlier this week and

reportedly developed acute shortness of breath with some associated chest

tightness, which prompted her to present to the [place] ED.  There she

underwent workup; which included a CTA, which was negative for PE; lower

extremity duplex, which was negative for DVT.  She had a troponin that was

minimally elevated that peaked at 0.06.  At outside hospital, she was treated

with IV diuretics.  She initially had a blood gas there that was remarkable

for a respiratory acidosis with a pH down to 7.18 requiring use of BiPAP.

 

On arrival here, was comfortable on room air but did endorse intermittent

dyspnea overnight yesterday.  She had repeat troponin here, which was

positive at 1.17 and has trended down since that time.  Initial presenting

EKG showed sinus rhythm with old septal infarct, but no dynamic ST

abnormalities.  Today, she had an echocardiogram, which showed moderate LV

dysfunction with an EF of around 35%.  Echo reported as global LV

dysfunction.  No significant valvular abnormalities.  Estimated PA and RA

pressures within normal limits.

 

PAST MEDICAL HISTORY:

1.  Coronary artery disease status post 3-vessel CABG in 2000.

-- Prior PCI with POBA to OM1 in January 2017.

-- Prior PCI with drug-eluting stent to ramus and into the left main

circumflex and OM1 in August of 2016 by Dr. Mathews at [facility].

-- CABG was in 2000 and vein graft to OM and ramus known to be occluded.

2.  Type 2 diabetes.

3.  Hypertension.

4.  Ongoing tobacco use.

5.  Hyperlipidemia.

6.  Severe GERD.

7.  Mild lower extremity peripheral arterial disease.

8.  Recent Rima's gangrene with persistent wound.

9.  Morbid obesity.

10.  Recent C. diff colitis.

 

PAST SURGICAL HISTORY:  Bilateral cataracts, multiple debridements for

Rima's gangrene, hysterectomy in 1999, appendectomy in 1997,

cholecystectomy in 1996, tubal ligation and CABG as discussed above.

 

FAMILY HISTORY:  Significant for multiple relatives with diabetes, also had

coronary artery disease in father, mother, and multiple siblings.

 

SOCIAL HISTORY:  She is an ongoing smoker, has smoked 1 pack per day for 50

years.  She is  with 3 children.  She lives with her  in

[place].  She is retired.  Previously worked as a .

 

ALLERGIES:  INCLUDE AMPICILLIN, CODEINE, SULFA.

 

HOME MEDICATIONS:  Aspirin, atorvastatin, clopidogrel, cyclobenzaprine,

insulin 70/30, metoprolol, and pantoprazole.

 

REVIEW OF SYSTEMS:  Otherwise, negative than listed in HPI.

 

PHYSICAL EXAMINATION:

VITAL SIGNS:  Temperature 36.8, pulse 98, blood pressure 134/71.  She is

satting 100% on 3 L.

GENERAL:  The patient appears chronically ill, obese, in no acute distress.

HEENT:  Her sclerae anicteric.  Her oropharynx is clear.  Her mucous

membranes are moist.

NECK:  Supple.  She has no significant jugular venous distention.

LUNG:  She has few crackles at her left lower base, but otherwise clear.

CARDIAC:  She has a regular rate and rhythm with no appreciable murmurs.

ABDOMEN:  Soft and nontender but obese.

EXTREMITIES:  Warm.  She has a 1+ lower extremity edema on the right greater

than left.  She has a dressed large wound over her right inner thigh, looked

at yesterday and she has pink granulation tissue with no surrounding erythema

or induration.

NEUROLOGIC:  Grossly nonfocal.

PSYCHIATRIC:  Alert and appropriate.

 

LABORATORY DATA:  Sodium 137, potassium 4.3, BUN of 12, creatinine of 1.18. 

TSH of 4.67.  Troponin 0.117, 0.092, and 0.07.  BNP at outside hospital was

greater than 8000.

 

Chest x-ray here showed cardiomegaly with mild congestive heart failure. 

Repeat lower extremity duplex showed no evidence of DVT.

 

IMPRESSION AND PLAN:

1.  Severe multivessel coronary artery disease/elevated troponin.

2.  Acute systolic heart failure/ischemic cardiomyopathy.

3.  Open right thigh wound post Rima's gangrene.

4.  Type 2 diabetes on insulin.

5.  Anemia.

6.  Hypertension/hyperlipidemia.

 

Ms. Charlette is here after a prolonged hospitalization in the setting of

Rima's gangrene requiring multiple debridements, now with healing right

inner thigh wound.  She presented to an outside hospital recently this week

with new chest discomfort and shortness of breath and was found to have signs

of pulmonary and systemic vascular congestion along with mildly elevated

troponin.  Repeat echocardiogram today showed moderate LV dysfunction with

what appeared to be more anterior/apical wall motion abnormalities per my

review.

 

At present, the patient's congestion appears to be improving following

multiple doses of IV diuretics.  She appears well perfused and is in no

distress.  Her troponin has peaked and she is having no additional chest

pain.

 

Suspicion that the primary event causing all the patient's current symptoms

was recurrent ACS is relatively low.  However, in the setting of known

multivessel disease including left main stenting and her minimally elevated

troponin with presumed to be new LV dysfunction, feel that further workup is

warranted.  We discussed possible catheterization on Monday via left radial

artery, which patient and  are agreeable.

 

In the interim, would continue daily IV diuretics.  Continue dual

antiplatelet therapy with aspirin and Plavix.  As it is more than 3 days out

from initial event, okay to hold off on additional heparin at this time.

 

Otherwise, would continue current beta blocker and statins.  Plan to start ACE

inhibitor.

 

We will continue to follow while in the hospital.

 

Thank you for consultation.  Please contact with any questions.

 

 

 

PORTER

## 2018-08-18 NOTE — DIAGNOSTIC IMAGING REPORT
R VENOUS DOPP LOWER EXT UNILAT



CLINICAL HISTORY: 62 years-old Female presenting with RLE EDEMA, PROLONGED

HOSPITALIZATION; EVAL FOR DVT. 



TECHNIQUE: Real-time grayscale and color and spectral Doppler ultrasound imaging

of the veins of the right lower extremity was performed. Compression and

augmentation were also utilized.



COMPARISON: 8/16/2018.



FINDINGS: 



RIGHT:

Common femoral vein: Patent.

Greater saphenous vein: Patent.

Deep femoral vein: Patent.

Femoral vein: Patent.

Popliteal vein: Patent.

Calf veins: Patent.



Other: Subcutaneous edema and skin thickening noted throughout the lower

extremity.



IMPRESSION:

No evidence of deep venous thrombosis.







Electronically signed by:  Brendon Hussein M.D.

8/18/2018 12:39 PM



Dictated Date/Time:  8/18/2018 12:39 PM

## 2018-08-19 VITALS — DIASTOLIC BLOOD PRESSURE: 56 MMHG | SYSTOLIC BLOOD PRESSURE: 98 MMHG | OXYGEN SATURATION: 100 % | HEART RATE: 89 BPM

## 2018-08-19 VITALS
DIASTOLIC BLOOD PRESSURE: 67 MMHG | TEMPERATURE: 98.06 F | OXYGEN SATURATION: 99 % | HEART RATE: 87 BPM | SYSTOLIC BLOOD PRESSURE: 97 MMHG

## 2018-08-19 VITALS
OXYGEN SATURATION: 98 % | DIASTOLIC BLOOD PRESSURE: 59 MMHG | HEART RATE: 89 BPM | SYSTOLIC BLOOD PRESSURE: 105 MMHG | TEMPERATURE: 97.52 F

## 2018-08-19 VITALS
DIASTOLIC BLOOD PRESSURE: 55 MMHG | TEMPERATURE: 97.52 F | OXYGEN SATURATION: 98 % | HEART RATE: 88 BPM | SYSTOLIC BLOOD PRESSURE: 100 MMHG

## 2018-08-19 VITALS — HEART RATE: 82 BPM | OXYGEN SATURATION: 98 %

## 2018-08-19 VITALS — OXYGEN SATURATION: 100 %

## 2018-08-19 VITALS — OXYGEN SATURATION: 96 % | HEART RATE: 89 BPM

## 2018-08-19 VITALS — OXYGEN SATURATION: 96 % | HEART RATE: 90 BPM

## 2018-08-19 VITALS — OXYGEN SATURATION: 88 % | HEART RATE: 99 BPM

## 2018-08-19 VITALS
TEMPERATURE: 97.52 F | DIASTOLIC BLOOD PRESSURE: 58 MMHG | OXYGEN SATURATION: 99 % | SYSTOLIC BLOOD PRESSURE: 114 MMHG | HEART RATE: 88 BPM

## 2018-08-19 LAB
BUN SERPL-MCNC: 13 MG/DL (ref 7–18)
CALCIUM SERPL-MCNC: 8.6 MG/DL (ref 8.5–10.1)
CO2 SERPL-SCNC: 27 MMOL/L (ref 21–32)
CREAT SERPL-MCNC: 1.08 MG/DL (ref 0.6–1.2)
EOSINOPHIL NFR BLD AUTO: 327 K/UL (ref 130–400)
GLUCOSE SERPL-MCNC: 138 MG/DL (ref 70–99)
HCT VFR BLD CALC: 26.4 % (ref 37–47)
HGB BLD-MCNC: 8.2 G/DL (ref 12–16)
MCH RBC QN AUTO: 28.3 PG (ref 25–34)
MCHC RBC AUTO-ENTMCNC: 31.1 G/DL (ref 32–36)
MCV RBC AUTO: 91 FL (ref 80–100)
PMV BLD AUTO: 8.1 FL (ref 7.4–10.4)
POTASSIUM SERPL-SCNC: 4 MMOL/L (ref 3.5–5.1)
RED CELL DISTRIBUTION WIDTH CV: 17.2 % (ref 11.5–14.5)
RED CELL DISTRIBUTION WIDTH SD: 56.7 FL (ref 36.4–46.3)
SODIUM SERPL-SCNC: 135 MMOL/L (ref 136–145)
WBC # BLD AUTO: 5.63 K/UL (ref 4.8–10.8)

## 2018-08-19 RX ADMIN — FERROUS SULFATE TAB EC 325 MG (65 MG FE EQUIVALENT) SCH MG: 325 (65 FE) TABLET DELAYED RESPONSE at 08:20

## 2018-08-19 RX ADMIN — Medication SCH ML: at 05:48

## 2018-08-19 RX ADMIN — IPRATROPIUM BROMIDE AND ALBUTEROL SULFATE SCH ML: .5; 3 SOLUTION RESPIRATORY (INHALATION) at 14:55

## 2018-08-19 RX ADMIN — LACTOBACILLUS TAB SCH TAB: TAB at 12:15

## 2018-08-19 RX ADMIN — Medication SCH TAB: at 08:18

## 2018-08-19 RX ADMIN — CLOPIDOGREL BISULFATE SCH MG: 75 TABLET, FILM COATED ORAL at 08:19

## 2018-08-19 RX ADMIN — VANCOMYCIN HYDROCHLORIDE SCH MG: 1 INJECTION, POWDER, LYOPHILIZED, FOR SOLUTION INTRAVENOUS at 05:48

## 2018-08-19 RX ADMIN — LACTOBACILLUS TAB SCH TAB: TAB at 08:18

## 2018-08-19 RX ADMIN — Medication SCH MG: at 08:19

## 2018-08-19 RX ADMIN — ONDANSETRON PRN MG: 2 INJECTION INTRAMUSCULAR; INTRAVENOUS at 14:13

## 2018-08-19 RX ADMIN — IPRATROPIUM BROMIDE AND ALBUTEROL SULFATE SCH ML: .5; 3 SOLUTION RESPIRATORY (INHALATION) at 11:02

## 2018-08-19 RX ADMIN — METOPROLOL TARTRATE SCH MG: 50 TABLET, FILM COATED ORAL at 21:05

## 2018-08-19 RX ADMIN — INSULIN GLARGINE SCH UNITS: 100 INJECTION, SOLUTION SUBCUTANEOUS at 08:24

## 2018-08-19 RX ADMIN — INSULIN ASPART SCH UNITS: 100 INJECTION, SOLUTION INTRAVENOUS; SUBCUTANEOUS at 08:24

## 2018-08-19 RX ADMIN — INSULIN GLARGINE SCH UNITS: 100 INJECTION, SOLUTION SUBCUTANEOUS at 21:11

## 2018-08-19 RX ADMIN — GABAPENTIN SCH MG: 100 CAPSULE ORAL at 21:06

## 2018-08-19 RX ADMIN — INSULIN ASPART SCH UNITS: 100 INJECTION, SOLUTION INTRAVENOUS; SUBCUTANEOUS at 17:35

## 2018-08-19 RX ADMIN — PANTOPRAZOLE SCH MG: 40 TABLET, DELAYED RELEASE ORAL at 08:18

## 2018-08-19 RX ADMIN — VANCOMYCIN HYDROCHLORIDE SCH MG: 1 INJECTION, POWDER, LYOPHILIZED, FOR SOLUTION INTRAVENOUS at 12:14

## 2018-08-19 RX ADMIN — INSULIN ASPART SCH UNITS: 100 INJECTION, SOLUTION INTRAVENOUS; SUBCUTANEOUS at 21:12

## 2018-08-19 RX ADMIN — Medication SCH ML: at 17:36

## 2018-08-19 RX ADMIN — INSULIN ASPART SCH UNITS: 100 INJECTION, SOLUTION INTRAVENOUS; SUBCUTANEOUS at 12:14

## 2018-08-19 RX ADMIN — METOPROLOL TARTRATE SCH MG: 50 TABLET, FILM COATED ORAL at 08:20

## 2018-08-19 RX ADMIN — FERROUS SULFATE TAB EC 325 MG (65 MG FE EQUIVALENT) SCH MG: 325 (65 FE) TABLET DELAYED RESPONSE at 21:03

## 2018-08-19 RX ADMIN — COLESTIPOL HYDROCHLORIDE SCH GM: 1 TABLET ORAL at 17:36

## 2018-08-19 RX ADMIN — Medication SCH CAN: at 08:17

## 2018-08-19 RX ADMIN — VANCOMYCIN HYDROCHLORIDE SCH MG: 1 INJECTION, POWDER, LYOPHILIZED, FOR SOLUTION INTRAVENOUS at 17:37

## 2018-08-19 RX ADMIN — OXYCODONE HYDROCHLORIDE AND ACETAMINOPHEN SCH MG: 500 TABLET ORAL at 08:19

## 2018-08-19 RX ADMIN — IPRATROPIUM BROMIDE AND ALBUTEROL SULFATE SCH ML: .5; 3 SOLUTION RESPIRATORY (INHALATION) at 06:55

## 2018-08-19 RX ADMIN — FUROSEMIDE SCH MLS/MIN: 10 INJECTION, SOLUTION INTRAMUSCULAR; INTRAVENOUS at 09:31

## 2018-08-19 RX ADMIN — Medication SCH CAN: at 17:30

## 2018-08-19 RX ADMIN — COLESTIPOL HYDROCHLORIDE SCH GM: 1 TABLET ORAL at 09:31

## 2018-08-19 RX ADMIN — ENOXAPARIN SODIUM SCH MG: 40 INJECTION SUBCUTANEOUS at 21:07

## 2018-08-19 RX ADMIN — Medication SCH ML: at 12:14

## 2018-08-19 RX ADMIN — IPRATROPIUM BROMIDE AND ALBUTEROL SULFATE SCH ML: .5; 3 SOLUTION RESPIRATORY (INHALATION) at 19:21

## 2018-08-19 RX ADMIN — ATORVASTATIN CALCIUM SCH MG: 40 TABLET, FILM COATED ORAL at 21:05

## 2018-08-19 RX ADMIN — PRIMIDONE SCH MG: 50 TABLET ORAL at 21:06

## 2018-08-19 RX ADMIN — LACTOBACILLUS TAB SCH TAB: TAB at 17:29

## 2018-08-19 RX ADMIN — COLESTIPOL HYDROCHLORIDE SCH GM: 1 TABLET ORAL at 21:06

## 2018-08-19 RX ADMIN — POTASSIUM CHLORIDE SCH MEQ: 1500 TABLET, EXTENDED RELEASE ORAL at 21:04

## 2018-08-19 RX ADMIN — POTASSIUM CHLORIDE SCH MEQ: 1500 TABLET, EXTENDED RELEASE ORAL at 08:20

## 2018-08-19 NOTE — PROGRESS NOTE
Subjective


Date of Service:


Aug 19, 2018.


Subjective


Pt evaluation today including:  conversation w/ patient, conversation w/ family 

( at bedside), physical exam, chart review, lab review, review of 

studies (doppler, right leg; echo), review of inpatient medication list


Pain:  denies


PO Intake:  ate good breakfast


Voiding:  goodwin catheter in place


tele - NSR overnight


she feels better


she slept better last pm 


not as much PND, orthopnea, or dyspnea


diarrhea persists but "not as bad"


no abd pain


ambulating to commode 


not as anxious today





Review of Systems


Constitutional:  No fever


Respiratory:  No cough


Cardiac:  + edema, No chest pain, No orthopnea, No PND


Abdomen:  No pain





Objective


Vital Signs











  Date Time  Temp Pulse Resp B/P (MAP) Pulse Ox O2 Delivery O2 Flow Rate FiO2


 


8/19/18 08:03 36.4 88 18 114/58 (76) 99   


 


8/19/18 08:00     100 Nasal Cannula 3.0 


 


8/19/18 06:55  99 20  88 Nasal Cannula 3.0 


 


8/19/18 05:19 36.7 87 22 97/67 (77) 99 Nasal Cannula 2.5 


 


8/18/18 23:12 36.8 95 20 107/52 (70) 100 Nasal Cannula 2.0 


 


8/18/18 20:00      Nasal Cannula 3.0 


 


8/18/18 19:55 36.6 101 18 117/67 (84) 96 Nasal Cannula 6.0 100


 


8/18/18 19:22  100 20  98 Nasal Cannula 3.0 


 


8/18/18 16:00     100 Nasal Cannula 3.0 


 


8/18/18 15:46  94 22  100 Nasal Cannula 5.0 


 


8/18/18 15:12 36.8 98 18 134/71 (92) 100 Nasal Cannula 6.0 100


 


8/18/18 13:40  111 22  100 Nasal Cannula 2.0 


 


8/18/18 12:06 36.8 84 18 121/79 (93) 100 BiPAP  


 


8/18/18 12:00     100 Nasal Cannula 3.0 


 


8/18/18 10:46  93 22  96 Nasal Cannula 2.0 











Physical Exam


General Appearance:  no apparent distress, + pertinent finding (sitting in chair

; looks a bit better today)


ENT:  pharynx normal


Neck:  no JVD


Respiratory/Chest:  no respiratory distress, no accessory muscle use, + crackles

 (fine, bases -  improved from prior exams)


Cardiovascular:  regular rate, rhythm, no gallop, no murmur


Abdomen:  normal bowel sounds, non tender, soft, no organomegaly


Extremities:  + pedal edema (2+ on right, 1+ on left; right leg is larger than 

left leg)


Neurologic/Psychiatric:  alert, oriented x 3, + depressed affect


Skin:  + pallor


Comments:


musculo/skin - right leg upper thigh with dressings intact





Laboratory Results





Last 24 Hours








Test


  8/18/18


11:01 8/18/18


11:46 8/18/18


16:06 8/18/18


20:13


 


Bedside Glucose 236 mg/dl   219 mg/dl  301 mg/dl 


 


Sodium Level  135 mmol/L   


 


Potassium Level  4.2 mmol/L   


 


Chloride Level  100 mmol/L   


 


Carbon Dioxide Level  26 mmol/L   


 


Anion Gap  9.0 mmol/L   


 


Blood Urea Nitrogen  12 mg/dl   


 


Creatinine  1.08 mg/dl   


 


Est Creatinine Clear Calc


Drug Dose 


  57.8 ml/min 


  


  


 


 


Estimated GFR (


American) 


  64.6 


  


  


 


 


Estimated GFR (Non-


American 


  55.8 


  


  


 


 


BUN/Creatinine Ratio  10.9   


 


Random Glucose  201 mg/dl   


 


Calcium Level  8.6 mg/dl   


 


Ammonia  < 10.0 umol/L   


 


Troponin I  0.070 ng/ml   


 


Random Cortisol  27.60 mcg/dl   


 


Test


  8/19/18


07:13 8/19/18


07:30 


  


 


 


Bedside Glucose 161 mg/dl    


 


White Blood Count  5.63 K/uL   


 


Red Blood Count  2.90 M/uL   


 


Hemoglobin  8.2 g/dL   


 


Hematocrit  26.4 %   


 


Mean Corpuscular Volume  91.0 fL   


 


Mean Corpuscular Hemoglobin  28.3 pg   


 


Mean Corpuscular Hemoglobin


Concent 


  31.1 g/dl 


  


  


 


 


RDW Standard Deviation  56.7 fL   


 


RDW Coefficient of Variation  17.2 %   


 


Platelet Count  327 K/uL   


 


Mean Platelet Volume  8.1 fL   


 


Sodium Level  135 mmol/L   


 


Potassium Level  4.0 mmol/L   


 


Chloride Level  100 mmol/L   


 


Carbon Dioxide Level  27 mmol/L   


 


Anion Gap  8.0 mmol/L   


 


Blood Urea Nitrogen  13 mg/dl   


 


Creatinine  1.08 mg/dl   


 


Est Creatinine Clear Calc


Drug Dose 


  57.2 ml/min 


  


  


 


 


Estimated GFR (


American) 


  64.6 


  


  


 


 


Estimated GFR (Non-


American 


  55.8 


  


  


 


 


BUN/Creatinine Ratio  11.8   


 


Random Glucose  138 mg/dl   


 


Calcium Level  8.6 mg/dl   


 


Magnesium Level  2.0 mg/dl   











Assessment and Plan


61yo female with history of CAD s/p CABG in 2000, HTN, T2DM, morbid obesity, 

long-standing asthma, prior tobacco dependence (50 pack years), and recent month

-long hospitalization at [facility] in [place] for Rima'

s gangrene of the right thigh/groin who presented as a transfer from [facility] due to concerns for ACS and acute CHF.  








1.  question of ACS in the setting of known CAD - had mild troponin elevation 

at [facility], and troponins were repeated here with peak of 0.1.  


I believe the troponin elevation was from demand ischemia rather than ACS.  

Demand ischemia likely from acute CHF. 


Will continue her chronic CAD meds including BB, asa, plavix, statin, etc. 





2.  acute systolic CHF - improving with diuresis.  Continue lasix 40mg IV daily

; could consider BID dosing if necessary. 


Pt reports her dry weight is about 200 pounds.  


Fluid restrict to 1500cc/day.  


Try to improve blood albumin level w/ supplements.  


Continue BB; adding low-dose ACE (lisinopril 5mg daily).


Possible heart cath tomorrow by Dr. Roman to exclude ischemic cardiomyopathy/

CAD as cause of new onset CHF. 


We are awaiting records from [facility] to see what prior echo showed. 


Daily BMP while here. 





3.  edema - likely due to #2.  Edema is assymetric - worse on right.  Dopplers 

of RLE neg for DVT.





4.  recent Rima's gangrene of right groin/thigh - unfortunately the Wound 

Care Team will not be available until this coming Monday.  In meantime place 

iodoform dressings in the wound bed and cover w/ sterile dressings.  Dr. Khan's consult appreciated.  Added MVI/zinc/Vit C to help promoted wound 

healing.  Added boost glucose control as well. 





5.  T2DM - uncontrolled; increase lantus to 20 units BID.


Tighten novolog correction factor and carb ratio. 





6.  h/o asthma - suspect she likely has COPD due to long-standing tobacco 

dependence - not in exacerbation at this time.  Nebs/inhalers prn for symptoms.

  





7.  morbid obesity - BMI 41.





8.  HTN - controlled; continue home medications.  Adding low-dose ACE for 

systolic CHF.





9.  CAD - continue BB, statin, asa, plavix.  See discussion above. 





10.  hyperlipidemia - statin. 





11.  DVT proph - lovenox once daily. 





12.  hypoalbuminemia / protein calorie malnutrition - MVI, vit C, zinc, and 

boost glucose control. 





13.  anemia - likely due to chronic disease - b12, folate wnl.  Iron studies 

most c/w ACD but could have low grade Fe def.  Will supplement.  


If Hb drops to <8 consider transfusion.  





14.  FEN - AHA/DM diet; saline lock.  


Fluid restrict 1500cc/day. 


BMP am. 





15.  low-normal BPs at times - checked cortisol level and wnl.   





16.  c diff colitis - cont vancomycin; records from [facility] state her 

stop date is 8/24/18.  Cont lactinex TID.  added colestipol 1gm BID but will 

increase to TID dosing given her frequent, ongoing diarrhea.   


Contract precautions. 





17.  h/o tobacco dependence - quit 1 month ago. 








PT, OT daly


 updated once again at bedside


oob to chair BID


Continued [facility] stay due to:  voiding difficulties, ambulation difficulties, 

multiple IV medications needed, other (probable need for heart cath)


Discharge planning:  uncertain

## 2018-08-19 NOTE — CARDIOLOGY FOLLOW-UP
Subjective


Subjective


Date of Service:


Aug 19, 2018.


Pt evaluation today including:  conversation w/ patient, conversation w/ family

, physical exam, chart review, lab review, review of studies, review of 

inpatient medication list


Additional Details:


Breathing improved this morning. 


No chest pain.


No other new complaints.





Review of Systems


Constitutional:  No fever


Respiratory:  No cough


Cardiac:  + edema, No chest pain, No orthopnea, No PND


Abdomen:  No pain





Objective


Vital Signs





Last Vital Signs Documentation








  Date Time  Temp Pulse Resp B/P (MAP) Pulse Ox O2 Delivery O2 Flow Rate FiO2


 


8/19/18 12:00  89 20 98/56 (70) 99 Nasal Cannula 3.0 


 


8/19/18 08:03 36.4       


 


8/18/18 19:55        100











Physical Exam:


General Appearance:  no apparent distress, + pertinent finding (sitting in chair

; looks a bit better today)


ENT:  pharynx normal


Neck:  no JVD


Respiratory/Chest:  no respiratory distress, no accessory muscle use, + crackles

 (fine, bases -  improved from prior exams)


Cardiovascular:  regular rate, rhythm, no gallop, no murmur


Abdomen:  normal bowel sounds, non tender, soft, no organomegaly


Extremities:  + pedal edema (improved from prior, 1+ on right)


Neurologic/Psychiatric:  alert, oriented x 3, + depressed affect


Skin:  + pallor





Assessment and Plan


1. Multi-vessel CAD/mildly elevated troponin


2. Acute systolic heart failure/ICM


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


4. DM2


5. Anemia





Good diuresis yesterday with BID IV lasix.  Improved breathing, LE edema.


Renal function stable


No recurrent chest pain. 





-- Plan for [facility] tomorrow to eval LM/circumflex stents and LIMA-LAD -- procedure 

via left wrist; procedure likely in afternoon.


-- Agree with continued daily IV diuretics


-- Continue current DAPT


-- Continue current beta-blocker; agree with starting ACE


-- Continue statin


Continued [facility] stay due to:  voiding difficulties, ambulation difficulties, 

multiple IV medications needed, other (probable need for heart cath)


Discharge planning:  uncertain


Medications:





Current Inpatient Medications








 Medications


  (Trade)  Dose


 Ordered  Sig/Patrick


 Route  Start Time


 Stop Time Status Last Admin


Dose Admin


 


 Enoxaparin Sodium


  (Lovenox Inj)  40 mg  HS


 SC  8/17/18 22:00


 9/16/18 21:59  8/18/18 21:16


40 MG


 


 Acetaminophen


  (Tylenol Tab)  650 mg  Q4H  PRN


 PO  8/17/18 17:45


 9/16/18 17:44   


 


 


 Magnesium


 Hydroxide


  (Milk Of


 Magnesia Susp)  30 ml  Q12H  PRN


 PO  8/17/18 17:45


 9/16/18 17:44   


 


 


 Ondansetron HCl


  (Zofran Inj)  4 mg  Q6H  PRN


 IV  8/17/18 17:45


 9/16/18 17:44   


 


 


 Nitroglycerin


  (Nitrostat Tab)  0.4 mg  UD  PRN


 SL  8/17/18 17:45


 9/16/18 17:44   


 


 


 Aspirin


  (Ecotrin Tab)  81 mg  QAM


 PO  8/18/18 09:00


 9/17/18 08:59  8/19/18 08:19


81 MG


 


 Atorvastatin


 Calcium


  (Lipitor Tab)  40 mg  HS


 PO  8/17/18 21:00


 9/16/18 20:59  8/18/18 21:14


40 MG


 


 Clopidogrel


 Bisulfate


  (plAVix TAB)  75 mg  DAILY


 PO  8/18/18 09:00


 9/17/18 08:59  8/19/18 08:19


75 MG


 


 Metoprolol


 Tartrate


  (Lopressor Tab)  50 mg  BID


 PO  8/17/18 21:00


 9/16/18 20:59  8/19/18 08:20


50 MG


 


 Pantoprazole


 Sodium


  (Protonix Tab)  40 mg  QAM


 PO  8/18/18 09:00


 9/17/18 08:59  8/19/18 08:18


40 MG


 


 Vancomycin HCl


  (Vancomycin Oral


 Soln)  125 mg  Q6


 PO  8/17/18 18:30


 8/27/18 18:29  8/19/18 12:14


125 MG


 


 Lactobacillus


 Acidophilus


  (Floranex Tab)  4 tab  TIDM


 PO  8/18/18 07:30


 9/17/18 07:29  8/19/18 12:15


4 TAB


 


 Insulin Aspart


  (novoLOG ASPART)  **SLIDING


 SCALE**


 **G...  ACHS


 SC  8/17/18 21:00


 9/16/18 20:59  8/19/18 12:14


5 UNITS


 


 Primidone


  (Mysoline Tab)  50 mg  HS


 PO  8/17/18 21:00


 9/16/18 20:59  8/18/18 21:15


50 MG


 


 Gabapentin


  (Neurontin Cap)  200 mg  HS


 PO  8/17/18 21:00


 9/16/18 20:59  8/18/18 21:15


200 MG


 


 Tramadol HCl


  (Ultram Tab)  50 mg  Q6H  PRN


 PO  8/17/18 18:00


 9/16/18 17:59   


 


 


 Glucose


  (Glucose 40% Gel)  15-30


 GRAMS 15


 GRAMS...  UD  PRN


 PO  8/17/18 18:30


 9/16/18 18:29   


 


 


 Glucose


  (Glucose Chew


 Tab)  4-8


 Tablets 4


 Tabl...  UD  PRN


 PO  8/17/18 18:30


 9/16/18 18:29   


 


 


 Dextrose


  (Dextrose 50%


 50ML Syringe)  25-50ML


 25ML FOR


 ...  UD  PRN


 IV  8/17/18 18:30


 9/16/18 18:29   


 


 


 Glucagon


  (Glucagon Inj)  1 mg  UD  PRN


 IM  8/17/18 18:30


 9/16/18 18:29   


 


 


 Carbohydrates


  (Carbohydrates


 For Hypoglycemia)  15-30 GRAMS


 15 grams if


 BSG 54-69...  UD  PRN


 PO  8/17/18 18:30


 9/16/18 18:29   


 


 


 Raspberry


  (Raspberry Syrup


 5ml Cup)  5 ml  Q6


 PO  8/17/18 18:30


 8/31/18 18:29  8/19/18 12:14


5 ML


 


 Albuterol/


 Ipratropium


  (Duoneb)  3 ml  QIDR


 INH  8/18/18 08:00


 9/17/18 07:59  8/19/18 11:02


3 ML


 


 Multivitamins/


 Minerals


  (Multivitamin W/


 Minerals Tab)  1 tab  QAM


 PO  8/19/18 09:00


 9/18/18 08:59  8/19/18 08:18


1 TAB


 


 Zinc Sulfate


  (Zinc Sulfate


 Cap)  220 mg  QAM


 PO  8/19/18 09:00


 9/18/18 08:59  8/19/18 08:19


220 MG


 


 Ascorbic Acid


  (Vitamin C Tab)  500 mg  QAM


 PO  8/19/18 09:00


 9/18/18 08:59  8/19/18 08:19


500 MG


 


 Ferrous Sulfate


  (Feosol Tab)  325 mg  BID


 PO  8/18/18 09:45


 9/17/18 09:44  8/19/18 08:20


325 MG


 


 Potassium Chloride


  (Klor-Con Tab)  20 meq  BID


 PO  8/18/18 09:45


 9/17/18 09:44  8/19/18 08:20


20 MEQ


 


 Enteral


 Nutritional


 Formula


  (Boost Glucose


 Control)  1 can  BIDM


 PO  8/18/18 16:45


 9/17/18 16:44  8/19/18 08:17


1 CAN


 


 Furosemide 40 mg/


 Syringe  4 ml @ 4


 mls/min  QAM


 IV  8/19/18 09:00


 9/18/18 08:59  8/19/18 09:31


4 MLS/MIN


 


 Lisinopril


  (Zestril Tab)  5 mg  QAM


 PO  8/19/18 09:00


 9/18/18 08:59  8/19/18 08:18


5 MG


 


 Insulin Glargine


  (Lantus Solostar


 Pen)  20 units  BID


 SC  8/19/18 09:00


 9/16/18 20:59  8/19/18 08:24


20 UNITS


 


 Colestipol HCl


  (Colestid Tab)  1 gm  TID@1000,1500,2200


 PO  8/19/18 15:00


 9/18/18 14:59   


 








Lab Results:


8/19/18 07:30








8/19/18 07:30

















Test


  8/19/18


07:30 8/19/18


11:29


 


Red Blood Count


  2.90 M/uL


(4.2-5.4) 


 


 


Mean Corpuscular Volume


  91.0 fL


() 


 


 


Mean Corpuscular Hemoglobin


  28.3 pg


(25-34) 


 


 


Mean Corpuscular Hemoglobin


Concent 31.1 g/dl


(32-36) 


 


 


RDW Standard Deviation


  56.7 fL


(36.4-46.3) 


 


 


RDW Coefficient of Variation


  17.2 %


(11.5-14.5) 


 


 


Mean Platelet Volume


  8.1 fL


(7.4-10.4) 


 


 


Anion Gap


  8.0 mmol/L


(3-11) 


 


 


Est Creatinine Clear Calc


Drug Dose 57.2 ml/min 


  


 


 


Estimated GFR (


American) 64.6 


  


 


 


Estimated GFR (Non-


American 55.8 


  


 


 


BUN/Creatinine Ratio 11.8 (10-20)  


 


Calcium Level


  8.6 mg/dl


(8.5-10.1) 


 


 


Magnesium Level


  2.0 mg/dl


(1.8-2.4) 


 


 


Bedside Glucose


  


  206 mg/dl


(70-90)

## 2018-08-20 VITALS — OXYGEN SATURATION: 96 % | HEART RATE: 72 BPM

## 2018-08-20 VITALS — DIASTOLIC BLOOD PRESSURE: 51 MMHG | SYSTOLIC BLOOD PRESSURE: 111 MMHG | HEART RATE: 90 BPM | OXYGEN SATURATION: 98 %

## 2018-08-20 VITALS
OXYGEN SATURATION: 96 % | HEART RATE: 82 BPM | TEMPERATURE: 98.42 F | DIASTOLIC BLOOD PRESSURE: 60 MMHG | SYSTOLIC BLOOD PRESSURE: 98 MMHG

## 2018-08-20 VITALS
HEART RATE: 97 BPM | OXYGEN SATURATION: 99 % | SYSTOLIC BLOOD PRESSURE: 110 MMHG | DIASTOLIC BLOOD PRESSURE: 61 MMHG | TEMPERATURE: 98.6 F

## 2018-08-20 VITALS
OXYGEN SATURATION: 97 % | DIASTOLIC BLOOD PRESSURE: 61 MMHG | SYSTOLIC BLOOD PRESSURE: 87 MMHG | TEMPERATURE: 98.06 F | HEART RATE: 77 BPM

## 2018-08-20 VITALS — SYSTOLIC BLOOD PRESSURE: 110 MMHG | OXYGEN SATURATION: 98 % | HEART RATE: 86 BPM | DIASTOLIC BLOOD PRESSURE: 73 MMHG

## 2018-08-20 VITALS
TEMPERATURE: 98.6 F | HEART RATE: 95 BPM | DIASTOLIC BLOOD PRESSURE: 53 MMHG | SYSTOLIC BLOOD PRESSURE: 94 MMHG | OXYGEN SATURATION: 93 %

## 2018-08-20 VITALS
OXYGEN SATURATION: 93 % | SYSTOLIC BLOOD PRESSURE: 94 MMHG | TEMPERATURE: 98.6 F | DIASTOLIC BLOOD PRESSURE: 53 MMHG | HEART RATE: 95 BPM

## 2018-08-20 VITALS
HEART RATE: 92 BPM | SYSTOLIC BLOOD PRESSURE: 116 MMHG | OXYGEN SATURATION: 98 % | DIASTOLIC BLOOD PRESSURE: 47 MMHG | TEMPERATURE: 98.42 F

## 2018-08-20 VITALS
SYSTOLIC BLOOD PRESSURE: 99 MMHG | HEART RATE: 88 BPM | OXYGEN SATURATION: 96 % | TEMPERATURE: 98.24 F | DIASTOLIC BLOOD PRESSURE: 46 MMHG

## 2018-08-20 VITALS — OXYGEN SATURATION: 93 % | HEART RATE: 75 BPM

## 2018-08-20 VITALS — HEART RATE: 97 BPM | SYSTOLIC BLOOD PRESSURE: 119 MMHG | DIASTOLIC BLOOD PRESSURE: 59 MMHG | OXYGEN SATURATION: 97 %

## 2018-08-20 VITALS — OXYGEN SATURATION: 97 % | SYSTOLIC BLOOD PRESSURE: 110 MMHG | DIASTOLIC BLOOD PRESSURE: 57 MMHG | HEART RATE: 84 BPM

## 2018-08-20 VITALS
HEART RATE: 79 BPM | TEMPERATURE: 97.88 F | DIASTOLIC BLOOD PRESSURE: 39 MMHG | OXYGEN SATURATION: 94 % | SYSTOLIC BLOOD PRESSURE: 86 MMHG

## 2018-08-20 VITALS
SYSTOLIC BLOOD PRESSURE: 114 MMHG | DIASTOLIC BLOOD PRESSURE: 63 MMHG | HEART RATE: 92 BPM | OXYGEN SATURATION: 95 % | TEMPERATURE: 98.24 F

## 2018-08-20 VITALS — SYSTOLIC BLOOD PRESSURE: 105 MMHG | HEART RATE: 94 BPM | DIASTOLIC BLOOD PRESSURE: 62 MMHG | OXYGEN SATURATION: 97 %

## 2018-08-20 VITALS
HEART RATE: 81 BPM | SYSTOLIC BLOOD PRESSURE: 104 MMHG | TEMPERATURE: 98.42 F | DIASTOLIC BLOOD PRESSURE: 61 MMHG | OXYGEN SATURATION: 97 %

## 2018-08-20 VITALS
HEART RATE: 78 BPM | OXYGEN SATURATION: 94 % | TEMPERATURE: 98.06 F | DIASTOLIC BLOOD PRESSURE: 59 MMHG | SYSTOLIC BLOOD PRESSURE: 83 MMHG

## 2018-08-20 VITALS — OXYGEN SATURATION: 96 % | HEART RATE: 91 BPM

## 2018-08-20 LAB
BUN SERPL-MCNC: 15 MG/DL (ref 7–18)
CALCIUM SERPL-MCNC: 8.5 MG/DL (ref 8.5–10.1)
CO2 SERPL-SCNC: 26 MMOL/L (ref 21–32)
CREAT SERPL-MCNC: 1.29 MG/DL (ref 0.6–1.2)
EOSINOPHIL NFR BLD AUTO: 285 K/UL (ref 130–400)
GLUCOSE SERPL-MCNC: 89 MG/DL (ref 70–99)
HCT VFR BLD CALC: 26.2 % (ref 37–47)
HGB BLD-MCNC: 8 G/DL (ref 12–16)
MCH RBC QN AUTO: 27.8 PG (ref 25–34)
MCHC RBC AUTO-ENTMCNC: 30.5 G/DL (ref 32–36)
MCV RBC AUTO: 91 FL (ref 80–100)
NRBC BLD AUTO-RTO: 0.3 %
NUCLEATED RED BLOOD CELL ABS: 0.02 K/UL (ref 0–0)
PMV BLD AUTO: 7.8 FL (ref 7.4–10.4)
POTASSIUM SERPL-SCNC: 4 MMOL/L (ref 3.5–5.1)
RED CELL DISTRIBUTION WIDTH CV: 17.1 % (ref 11.5–14.5)
RED CELL DISTRIBUTION WIDTH SD: 56 FL (ref 36.4–46.3)
SODIUM SERPL-SCNC: 138 MMOL/L (ref 136–145)
WBC # BLD AUTO: 5.64 K/UL (ref 4.8–10.8)

## 2018-08-20 PROCEDURE — 4A023N7 MEASUREMENT OF CARDIAC SAMPLING AND PRESSURE, LEFT HEART, PERCUTANEOUS APPROACH: ICD-10-PCS | Performed by: INTERNAL MEDICINE

## 2018-08-20 PROCEDURE — B2100ZZ FLUOROSCOPY OF SINGLE CORONARY ARTERY USING HIGH OSMOLAR CONTRAST: ICD-10-PCS | Performed by: INTERNAL MEDICINE

## 2018-08-20 PROCEDURE — B2120ZZ FLUOROSCOPY OF SINGLE CORONARY ARTERY BYPASS GRAFT USING HIGH OSMOLAR CONTRAST: ICD-10-PCS | Performed by: INTERNAL MEDICINE

## 2018-08-20 RX ADMIN — VANCOMYCIN HYDROCHLORIDE SCH MG: 1 INJECTION, POWDER, LYOPHILIZED, FOR SOLUTION INTRAVENOUS at 06:01

## 2018-08-20 RX ADMIN — POTASSIUM CHLORIDE SCH MEQ: 1500 TABLET, EXTENDED RELEASE ORAL at 09:31

## 2018-08-20 RX ADMIN — INSULIN ASPART SCH UNITS: 100 INJECTION, SOLUTION INTRAVENOUS; SUBCUTANEOUS at 07:00

## 2018-08-20 RX ADMIN — GABAPENTIN SCH MG: 100 CAPSULE ORAL at 21:02

## 2018-08-20 RX ADMIN — VANCOMYCIN HYDROCHLORIDE SCH MG: 1 INJECTION, POWDER, LYOPHILIZED, FOR SOLUTION INTRAVENOUS at 17:47

## 2018-08-20 RX ADMIN — COLESTIPOL HYDROCHLORIDE SCH GM: 1 TABLET ORAL at 15:00

## 2018-08-20 RX ADMIN — COLESTIPOL HYDROCHLORIDE SCH GM: 1 TABLET ORAL at 09:30

## 2018-08-20 RX ADMIN — METOPROLOL TARTRATE SCH MG: 50 TABLET, FILM COATED ORAL at 09:00

## 2018-08-20 RX ADMIN — ATORVASTATIN CALCIUM SCH MG: 40 TABLET, FILM COATED ORAL at 21:00

## 2018-08-20 RX ADMIN — OXYCODONE HYDROCHLORIDE AND ACETAMINOPHEN SCH MG: 500 TABLET ORAL at 09:28

## 2018-08-20 RX ADMIN — INSULIN GLARGINE SCH UNITS: 100 INJECTION, SOLUTION SUBCUTANEOUS at 09:35

## 2018-08-20 RX ADMIN — INSULIN ASPART SCH UNITS: 100 INJECTION, SOLUTION INTRAVENOUS; SUBCUTANEOUS at 11:00

## 2018-08-20 RX ADMIN — LACTOBACILLUS TAB SCH TAB: TAB at 11:30

## 2018-08-20 RX ADMIN — VANCOMYCIN HYDROCHLORIDE SCH MG: 1 INJECTION, POWDER, LYOPHILIZED, FOR SOLUTION INTRAVENOUS at 23:49

## 2018-08-20 RX ADMIN — FUROSEMIDE SCH MLS/MIN: 10 INJECTION, SOLUTION INTRAMUSCULAR; INTRAVENOUS at 09:27

## 2018-08-20 RX ADMIN — Medication SCH CAN: at 17:03

## 2018-08-20 RX ADMIN — Medication SCH ML: at 23:49

## 2018-08-20 RX ADMIN — Medication SCH MG: at 09:29

## 2018-08-20 RX ADMIN — LACTOBACILLUS TAB SCH TAB: TAB at 17:04

## 2018-08-20 RX ADMIN — Medication SCH CAN: at 07:30

## 2018-08-20 RX ADMIN — POTASSIUM CHLORIDE SCH MEQ: 1500 TABLET, EXTENDED RELEASE ORAL at 21:00

## 2018-08-20 RX ADMIN — Medication SCH MG: at 09:28

## 2018-08-20 RX ADMIN — COLESTIPOL HYDROCHLORIDE SCH GM: 1 TABLET ORAL at 21:55

## 2018-08-20 RX ADMIN — Medication SCH ML: at 06:01

## 2018-08-20 RX ADMIN — Medication SCH ML: at 00:34

## 2018-08-20 RX ADMIN — METOPROLOL TARTRATE SCH MG: 50 TABLET, FILM COATED ORAL at 21:01

## 2018-08-20 RX ADMIN — VANCOMYCIN HYDROCHLORIDE SCH MG: 1 INJECTION, POWDER, LYOPHILIZED, FOR SOLUTION INTRAVENOUS at 12:17

## 2018-08-20 RX ADMIN — IPRATROPIUM BROMIDE AND ALBUTEROL SULFATE SCH ML: .5; 3 SOLUTION RESPIRATORY (INHALATION) at 19:02

## 2018-08-20 RX ADMIN — PRIMIDONE SCH MG: 50 TABLET ORAL at 21:01

## 2018-08-20 RX ADMIN — INSULIN ASPART SCH UNITS: 100 INJECTION, SOLUTION INTRAVENOUS; SUBCUTANEOUS at 21:00

## 2018-08-20 RX ADMIN — IPRATROPIUM BROMIDE AND ALBUTEROL SULFATE SCH ML: .5; 3 SOLUTION RESPIRATORY (INHALATION) at 15:15

## 2018-08-20 RX ADMIN — ENOXAPARIN SODIUM SCH MG: 40 INJECTION SUBCUTANEOUS at 21:03

## 2018-08-20 RX ADMIN — LACTOBACILLUS TAB SCH TAB: TAB at 09:29

## 2018-08-20 RX ADMIN — VANCOMYCIN HYDROCHLORIDE SCH MG: 1 INJECTION, POWDER, LYOPHILIZED, FOR SOLUTION INTRAVENOUS at 00:34

## 2018-08-20 RX ADMIN — IPRATROPIUM BROMIDE AND ALBUTEROL SULFATE SCH ML: .5; 3 SOLUTION RESPIRATORY (INHALATION) at 11:08

## 2018-08-20 RX ADMIN — IPRATROPIUM BROMIDE AND ALBUTEROL SULFATE SCH ML: .5; 3 SOLUTION RESPIRATORY (INHALATION) at 06:59

## 2018-08-20 RX ADMIN — INSULIN ASPART SCH UNITS: 100 INJECTION, SOLUTION INTRAVENOUS; SUBCUTANEOUS at 17:05

## 2018-08-20 RX ADMIN — FERROUS SULFATE TAB EC 325 MG (65 MG FE EQUIVALENT) SCH MG: 325 (65 FE) TABLET DELAYED RESPONSE at 09:45

## 2018-08-20 RX ADMIN — Medication SCH ML: at 17:47

## 2018-08-20 RX ADMIN — Medication SCH TAB: at 09:29

## 2018-08-20 RX ADMIN — FERROUS SULFATE TAB EC 325 MG (65 MG FE EQUIVALENT) SCH MG: 325 (65 FE) TABLET DELAYED RESPONSE at 20:59

## 2018-08-20 RX ADMIN — INSULIN GLARGINE SCH UNITS: 100 INJECTION, SOLUTION SUBCUTANEOUS at 21:06

## 2018-08-20 RX ADMIN — Medication SCH ML: at 12:17

## 2018-08-20 RX ADMIN — CLOPIDOGREL BISULFATE SCH MG: 75 TABLET, FILM COATED ORAL at 09:27

## 2018-08-20 RX ADMIN — PANTOPRAZOLE SCH MG: 40 TABLET, DELAYED RELEASE ORAL at 09:29

## 2018-08-20 RX ADMIN — LISINOPRIL SCH MG: 2.5 TABLET ORAL at 09:29

## 2018-08-20 NOTE — PRE SEDATION ASSESSMENT
Pre Sedation Assessment


General


Date of Sedation:


Aug 20, 2018.





Vital Signs Past 12 Hours








  Date Time  Temp Pulse Resp B/P (MAP) Pulse Ox O2 Delivery O2 Flow Rate FiO2


 


8/20/18 11:52 36.9 92 19 116/47 (70) 98 Room Air  


 


8/20/18 11:09  72 18  96 Room Air  


 


8/20/18 08:00      Room Air  


 


8/20/18 07:53 36.7 78 18 83/59 (67) 94 Room Air  


 


8/20/18 06:59  75 18  93 Room Air  


 


8/20/18 04:03 36.6 79 17 86/39 (55) 94 Room Air  











Review


Cardiovascular:  regular rate, rhythm, no edema


Lungs:  chest non-tender, lungs clear





Pre-Sedation Airway Assessment


Smoking Status:  Former Smoker (1 ppd x 50 years )


Hx of Sleep Apnea:  Yes


Hx of difficult intubation:  No


Short Thick Neck:  No


Thyro-mental Distance:  > 3 Finger Breadths


Mallampati Classification:  Class III


ASA Classification:  Class III





Procedure Planning


Contraindications for Sedation:  None


Current Medications Reviewed:  Yes





Notes








The planned sedation has been discussed with the patient. Informed Consent was 

obtained.  I have identified the patient, determined the appropriateness of 

sedation and have assessed the patient immediately prior to the procedure.  





All medicine(s) and interventions are by my order.

## 2018-08-20 NOTE — POST SEDATION ASSESSMENT
Post Sedation Assessment


General


Date of Sedation


Aug 20, 2018.


Vital Signs:





Vital Signs Past 12 Hours








  Date Time  Temp Pulse Resp B/P (MAP) Pulse Ox O2 Delivery O2 Flow Rate FiO2


 


8/20/18 11:52 36.9 92 19 116/47 (70) 98 Room Air  


 


8/20/18 11:09  72 18  96 Room Air  


 


8/20/18 08:00      Room Air  


 


8/20/18 07:53 36.7 78 18 83/59 (67) 94 Room Air  


 


8/20/18 06:59  75 18  93 Room Air  


 


8/20/18 04:03 36.6 79 17 86/39 (55) 94 Room Air  











Post Procedure Recovery Score


Activity:  (2) Moves 4 extremities *


Respiration:  (2) Deep breath/cough


Circulation:  (2) +/-20% PreAnes Value


Consciousness:  (2) Fully Awake


Oxygen Saturation:  (2) > 92% On Room Air





Discharge Sedation


Level of Care:  Fast Track Phase II





Post Sedation Plan


On clinical assessment, the patient appears to have tolerated the sedation 

without complications. Patient is recovering as anticipated.





Patient will continue to be monitored by nursing and may be discharged when 

sedation discharge criteria are met per below protocol. 





Upon Completions of procedure and additional 15 minutes continue every 5 minute 

vital signs and the P.A.R. score; then discharge to a Phase I or Fast Track to 

Phase II per the following guidelines:





* Discharge Patient to appropriate Phase II area if PAR is 8 or greater or 

return to pre- procedure baseline.  The post - procedure orders will be as 

directed. 





* If PAR score is less than 8 or not return to pre-procedure baseline then 

patient will follow Phase I monitoring till PAR is reached for Phase II.  The 

Phase I may be done in procedure room or may call to secure a Phase I area.





* If naloxone or flumazenil are used for reversal, hold in Phase I for an 

additional 60 -120 minutes before discharge to Phase II.  Please call the 

Sedation Physician to re-evaluate and complete post-note for discharge to Phase 

II area. 





Do NOT discharge from procedure sedation or Phase 1 until post- sedation 

evaluation note is complete by procedure /sedation MD





Sedation Discharge Instructions to be given to the patient at discharge to home.

## 2018-08-20 NOTE — PROGRESS NOTE
Subjective


Date of Service:


Aug 20, 2018.


Subjective


Pt evaluation today including:  conversation w/ patient, conversation w/ family 

( at bedside), physical exam, chart review, lab review, review of studies

, conversation w/ consultant (wound care nurse), review of inpatient medication 

list


Pain:  right groin only


PO Intake:  npo for cath


Voiding:  goodwin catheter in place


tele - NSR overnight





pt laying comfortably flat this am without orthopnea/dyspnea


she feels better overall


less BARRERA as well


no chest pain


no abd pain


diarrhea improved





patient expresses concerns about her  that he is not eating well and 

that they have financial stressors at home


she was quite teary eyed during the visit and expressed anxiety about 

everything going on





Review of Systems


Constitutional:  No fever, No chills


Respiratory:  No cough, No shortness of breath


Cardiac:  + edema, No chest pain, No orthopnea, No PND


Abdomen:  + vomiting (1x - due to potassium supplement), No pain, No nausea





Objective


Vital Signs











  Date Time  Temp Pulse Resp B/P (MAP) Pulse Ox O2 Delivery O2 Flow Rate FiO2


 


8/20/18 07:53 36.7 78 18 83/59 (67) 94 Room Air  


 


8/20/18 06:59  75 18  93 Room Air  


 


8/20/18 04:03 36.6 79 17 86/39 (55) 94 Room Air  


 


8/20/18 00:20 36.7 77 22 87/61 (70) 97 Room Air  


 


8/19/18 20:00      Room Air  


 


8/19/18 19:21  82 20  98 Room Air  


 


8/19/18 19:05 36.4 89 18 105/59 (74) 98 Room Air  


 


8/19/18 16:00     100 Nasal Cannula 3.0 


 


8/19/18 15:49 36.4 88 18 100/55 (70) 98   


 


8/19/18 14:55  89 20  96 Room Air  


 


8/19/18 12:00  89 20 98/56 (70) 99 Nasal Cannula 3.0 


 


8/19/18 12:00     100 Nasal Cannula 3.0 


 


8/19/18 11:03  90 20  96 Room Air  











Physical Exam


General Appearance:  no apparent distress, + obese, + pertinent finding (lying 

completely flat in bed - no orthopnea)


ENT:  pharynx normal


Neck:  no JVD


Respiratory/Chest:  lungs clear, no respiratory distress, no accessory muscle 

use, + rales (scant - bases)


Cardiovascular:  regular rate, rhythm, no gallop, no murmur


Abdomen:  normal bowel sounds, non tender, soft, no organomegaly, + pertinent 

finding (scars abdominal wall)


Extremities:  + pedal edema, + swelling (1+ on right, <1+ on left - improved), 

+ pertinent finding (pulses 2+ b/l feet)


Neurologic/Psychiatric:  alert, oriented x 3, + depressed affect


Skin:  + pertinent finding (very large wound - up to 11cm in diameter - right 

groin and right anterior thigh; wound bed pink; serous drainage only; no 

discharge or odor or cellulitis at periphery; smaller ulceration lateral right 

anterior thigh )





Laboratory Results





Last 24 Hours








Test


  8/19/18


11:29 8/19/18


16:18 8/19/18


20:15 8/20/18


06:12


 


Bedside Glucose 206 mg/dl  154 mg/dl  182 mg/dl  


 


White Blood Count    5.64 K/uL 


 


Red Blood Count    2.88 M/uL 


 


Hemoglobin    8.0 g/dL 


 


Hematocrit    26.2 % 


 


Mean Corpuscular Volume    91.0 fL 


 


Mean Corpuscular Hemoglobin    27.8 pg 


 


Mean Corpuscular Hemoglobin


Concent 


  


  


  30.5 g/dl 


 


 


RDW Standard Deviation    56.0 fL 


 


RDW Coefficient of Variation    17.1 % 


 


Platelet Count    285 K/uL 


 


Mean Platelet Volume    7.8 fL 


 


Nucleated RBC Absolute Count


(auto) 


  


  


  0.02 K/uL 


 


 


Nucleated Red Blood Cells %    0.3 % 


 


Sodium Level    138 mmol/L 


 


Potassium Level    4.0 mmol/L 


 


Chloride Level    101 mmol/L 


 


Carbon Dioxide Level    26 mmol/L 


 


Anion Gap    11.0 mmol/L 


 


Blood Urea Nitrogen    15 mg/dl 


 


Creatinine    1.29 mg/dl 


 


Est Creatinine Clear Calc


Drug Dose 


  


  


  47.6 ml/min 


 


 


Estimated GFR (


American) 


  


  


  52.1 


 


 


Estimated GFR (Non-


American 


  


  


  45.0 


 


 


BUN/Creatinine Ratio    11.4 


 


Random Glucose    89 mg/dl 


 


Calcium Level    8.5 mg/dl 











Assessment and Plan


61yo female with history of CAD s/p CABG in 2000, HTN, T2DM, morbid obesity, 

long-standing asthma, prior tobacco dependence (50 pack years), and recent month

-long hospitalization at [facility] in [place] for Rima'

s gangrene of the right thigh/groin who presented as a transfer from [facility] due to concerns for ACS and acute CHF.  








1.  question of ACS in the setting of known CAD - had mild troponin elevation 

at [facility], and troponins were repeated here with peak of 0.1.  


Troponin elevation was likely from demand ischemia rather than ACS.  Demand 

ischemia likely from acute CHF. 


Will continue her chronic CAD meds including BB, asa, plavix, statin, etc. 


Will have heart cath today, however, to exclude CAD as cause of #2. 





2.  acute systolic CHF - markedly improved.  No orthopnea today.  BPs are low 

and thus will hold on diuresis this AM. 


Fluid restrict to 1500cc/day.  


Try to improve blood albumin level w/ supplements.  


Continue BB; added low-dose ACE (lisinopril 2.5mg daily) but we may be limited 

with BP.


Heart cath today by Dr. Roman to exclude ischemic cardiomyopathy/CAD as cause 

of new onset CHF. 


We are awaiting records from [facility] to see what prior echo showed. 


Daily BMP while here. 





3.  edema - likely due to #2.  Edema is assymetric - worse on right.  Dopplers 

of RLE neg for DVT.  TSH was largely normal.  Low albumin certainly isn't 

helping her edema either. 





4.  recent Rima's gangrene of right groin/thigh - appreciate Wound Care 

Team consultation.  Attempts will be made to place woundvac back on the wound.  

Added MVI/zinc/Vit C to help promote wound healing.  Added boost glucose 

control as well. 





5.  T2DM - uncontrolled but improved with adjustments in lantus + novolog.  No 

changes today. 





6.  h/o asthma - suspect she likely has COPD due to long-standing tobacco 

dependence - not in exacerbation at this time.  Nebs/inhalers prn for symptoms.

  





7.  morbid obesity - BMI 40.





8.  HTN - controlled; continue home medications.  Added low-dose ACE for 

systolic CHF but BP is low and we may need to hold if BPs stay <90.





9.  CAD - continue BB, statin, asa, plavix.  See discussion above. 





10.  hyperlipidemia - statin. 





11.  DVT proph - lovenox once daily. 





12.  hypoalbuminemia / protein calorie malnutrition - MVI, vit C, zinc, and 

boost glucose control. 





13.  anemia - likely due to chronic disease - b12, folate wnl.  Iron studies 

most c/w ACD but could have low grade Fe def.  Will supplement.  


We are approaching a point where she may need PRBCs.  This likely would help BP

, fatigue, and - if coronary occlusions are found - would help offload her CAD.

  





14.  FEN - AHA/DM diet but NPO this am for cath; saline lock.  


Fluid restrict 1500cc/day. 


BMP am. 





15.  low-normal BPs at times - checked cortisol level and wnl.   





16.  c diff colitis - cont vancomycin; records from [facility] state her 

stop date is 8/24/18.  Cont lactinex TID.  Colestipol 1gm TID. 


Overall improving. 





17.  h/o tobacco dependence - quit 1 month ago. 








PT, OT daly


 updated once again at bedside


oob to chair BID 


suspect she will need rehab following this admission





spoke with Alice Britton from case management re: pt's concerns about finances

, her 's health, etc


Continued [facility] stay due to:  voiding difficulties, ambulation difficulties, 

multiple IV medications needed, other (probable need for heart cath)


Discharge planning:  uncertain

## 2018-08-20 NOTE — CARDIOLOGY FOLLOW-UP
Subjective


Subjective


Date of Service:


Aug 20, 2018.


Pt evaluation today including:  conversation w/ patient, conversation w/ family

, chart review, lab review, review of inpatient medication list


Additional Details:


No events overnight. 


No chest pain.  Breathing stable.  


No events on telemetry.





Review of Systems


Constitutional:  No fever, No chills


Respiratory:  No cough, No shortness of breath


Cardiac:  + edema


Abdomen:  + vomiting





Objective


Vital Signs





Last Vital Signs Documentation








  Date Time  Temp Pulse Resp B/P (MAP) Pulse Ox O2 Delivery O2 Flow Rate FiO2


 


8/20/18 11:52 36.9 92 19 116/47 (70) 98 Room Air  


 


8/19/18 16:00       3.0 


 


8/18/18 19:55        100











Physical Exam:


General Appearance:  no apparent distress, + obese


ENT:  pharynx normal


Neck:  no JVD


Respiratory/Chest:  lungs clear, no respiratory distress, no accessory muscle 

use


Cardiovascular:  regular rate, rhythm, no gallop, no murmur


Abdomen:  normal bowel sounds, soft, + pertinent finding


Extremities:  + pedal edema, + swelling, + pertinent finding (right groin wound 

dressed)


Neurologic/Psychiatric:  alert, oriented x 3, + depressed affect





Assessment and Plan


1. Multi-vessel CAD/mildly elevated troponin


2. Acute systolic heart failure/ICM


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


4. DM2


5. Anemia





Diuresing well.  Renal function stable. 


No recurrent chest pain. 





-- Proceed with [facility] today.





Further recommendations pending findings.


Continued [facility] stay due to:  voiding difficulties, ambulation difficulties, 

multiple IV medications needed, other (probable need for heart cath)


Discharge planning:  uncertain


Medications:





Current Inpatient Medications








 Medications


  (Trade)  Dose


 Ordered  Sig/Patrick


 Route  Start Time


 Stop Time Status Last Admin


Dose Admin


 


 Enoxaparin Sodium


  (Lovenox Inj)  40 mg  HS


 SC  8/17/18 22:00


 9/16/18 21:59  8/19/18 21:07


40 MG


 


 Acetaminophen


  (Tylenol Tab)  650 mg  Q4H  PRN


 PO  8/17/18 17:45


 9/16/18 17:44   


 


 


 Magnesium


 Hydroxide


  (Milk Of


 Magnesia Susp)  30 ml  Q12H  PRN


 PO  8/17/18 17:45


 9/16/18 17:44   


 


 


 Ondansetron HCl


  (Zofran Inj)  4 mg  Q6H  PRN


 IV  8/17/18 17:45


 9/16/18 17:44  8/19/18 14:13


4 MG


 


 Nitroglycerin


  (Nitrostat Tab)  0.4 mg  UD  PRN


 SL  8/17/18 17:45


 9/16/18 17:44   


 


 


 Aspirin


  (Ecotrin Tab)  81 mg  QAM


 PO  8/18/18 09:00


 9/17/18 08:59  8/20/18 09:29


81 MG


 


 Atorvastatin


 Calcium


  (Lipitor Tab)  40 mg  HS


 PO  8/17/18 21:00


 9/16/18 20:59  8/19/18 21:05


40 MG


 


 Clopidogrel


 Bisulfate


  (plAVix TAB)  75 mg  DAILY


 PO  8/18/18 09:00


 9/17/18 08:59  8/20/18 09:27


75 MG


 


 Metoprolol


 Tartrate


  (Lopressor Tab)  50 mg  BID


 PO  8/17/18 21:00


 9/16/18 20:59  8/19/18 21:05


50 MG


 


 Pantoprazole


 Sodium


  (Protonix Tab)  40 mg  QAM


 PO  8/18/18 09:00


 9/17/18 08:59  8/20/18 09:29


40 MG


 


 Vancomycin HCl


  (Vancomycin Oral


 Soln)  125 mg  Q6


 PO  8/17/18 18:30


 8/27/18 18:29  8/20/18 12:17


125 MG


 


 Lactobacillus


 Acidophilus


  (Floranex Tab)  4 tab  TIDM


 PO  8/18/18 07:30


 9/17/18 07:29  8/20/18 09:29


4 TAB


 


 Insulin Aspart


  (novoLOG ASPART)  **SLIDING


 SCALE**


 **G...  ACHS


 SC  8/17/18 21:00


 9/16/18 20:59  8/19/18 21:12


1 UNITS


 


 Primidone


  (Mysoline Tab)  50 mg  HS


 PO  8/17/18 21:00


 9/16/18 20:59  8/19/18 21:06


50 MG


 


 Gabapentin


  (Neurontin Cap)  200 mg  HS


 PO  8/17/18 21:00


 9/16/18 20:59  8/19/18 21:06


200 MG


 


 Tramadol HCl


  (Ultram Tab)  50 mg  Q6H  PRN


 PO  8/17/18 18:00


 9/16/18 17:59   


 


 


 Glucose


  (Glucose 40% Gel)  15-30


 GRAMS 15


 GRAMS...  UD  PRN


 PO  8/17/18 18:30


 9/16/18 18:29   


 


 


 Glucose


  (Glucose Chew


 Tab)  4-8


 Tablets 4


 Tabl...  UD  PRN


 PO  8/17/18 18:30


 9/16/18 18:29   


 


 


 Dextrose


  (Dextrose 50%


 50ML Syringe)  25-50ML


 25ML FOR


 ...  UD  PRN


 IV  8/17/18 18:30


 9/16/18 18:29   


 


 


 Glucagon


  (Glucagon Inj)  1 mg  UD  PRN


 IM  8/17/18 18:30


 9/16/18 18:29   


 


 


 Carbohydrates


  (Carbohydrates


 For Hypoglycemia)  15-30 GRAMS


 15 grams if


 BSG 54-69...  UD  PRN


 PO  8/17/18 18:30


 9/16/18 18:29   


 


 


 Raspberry


  (Raspberry Syrup


 5ml Cup)  5 ml  Q6


 PO  8/17/18 18:30


 8/31/18 18:29  8/20/18 12:17


5 ML


 


 Albuterol/


 Ipratropium


  (Duoneb)  3 ml  QIDR


 INH  8/18/18 08:00


 9/17/18 07:59  8/20/18 11:08


3 ML


 


 Multivitamins/


 Minerals


  (Multivitamin W/


 Minerals Tab)  1 tab  QAM


 PO  8/19/18 09:00


 9/18/18 08:59  8/20/18 09:29


1 TAB


 


 Zinc Sulfate


  (Zinc Sulfate


 Cap)  220 mg  QAM


 PO  8/19/18 09:00


 9/18/18 08:59  8/20/18 09:28


220 MG


 


 Ascorbic Acid


  (Vitamin C Tab)  500 mg  QAM


 PO  8/19/18 09:00


 9/18/18 08:59  8/20/18 09:28


500 MG


 


 Ferrous Sulfate


  (Feosol Tab)  325 mg  BID


 PO  8/18/18 09:45


 9/17/18 09:44  8/20/18 09:45


325 MG


 


 Potassium Chloride


  (Klor-Con Tab)  20 meq  BID


 PO  8/18/18 09:45


 9/17/18 09:44  8/20/18 09:31


20 MEQ


 


 Enteral


 Nutritional


 Formula


  (Boost Glucose


 Control)  1 can  BIDM


 PO  8/18/18 16:45


 9/17/18 16:44  8/19/18 17:30


1 CAN


 


 Furosemide 40 mg/


 Syringe  4 ml @ 4


 mls/min  QAM


 IV  8/19/18 09:00


 9/18/18 08:59 Future Hold 8/20/18 09:27


4 MLS/MIN


 


 Insulin Glargine


  (Lantus Solostar


 Pen)  20 units  BID


 SC  8/19/18 09:00


 9/16/18 20:59  8/20/18 09:35


10 UNITS


 


 Colestipol HCl


  (Colestid Tab)  1 gm  TID@1000,1500,2200


 PO  8/19/18 15:00


 9/18/18 14:59  8/20/18 09:30


1 GM


 


 Lisinopril


  (Zestril Tab)  2.5 mg  QAM


 PO  8/20/18 09:00


 9/18/18 08:59  8/20/18 09:29


2.5 MG








Lab Results:


8/20/18 06:12








8/20/18 06:12

















Test


  8/20/18


06:12 8/20/18


11:29


 


Red Blood Count


  2.88 M/uL


(4.2-5.4) 


 


 


Mean Corpuscular Volume


  91.0 fL


() 


 


 


Mean Corpuscular Hemoglobin


  27.8 pg


(25-34) 


 


 


Mean Corpuscular Hemoglobin


Concent 30.5 g/dl


(32-36) 


 


 


RDW Standard Deviation


  56.0 fL


(36.4-46.3) 


 


 


RDW Coefficient of Variation


  17.1 %


(11.5-14.5) 


 


 


Mean Platelet Volume


  7.8 fL


(7.4-10.4) 


 


 


Nucleated RBC Absolute Count


(auto) 0.02 K/uL


(0-0) 


 


 


Nucleated Red Blood Cells % 0.3 %  


 


Anion Gap


  11.0 mmol/L


(3-11) 


 


 


Est Creatinine Clear Calc


Drug Dose 47.6 ml/min 


  


 


 


Estimated GFR (


American) 52.1 


  


 


 


Estimated GFR (Non-


American 45.0 


  


 


 


BUN/Creatinine Ratio 11.4 (10-20)  


 


Calcium Level


  8.5 mg/dl


(8.5-10.1) 


 


 


Bedside Glucose


  


  130 mg/dl


(70-90)

## 2018-08-20 NOTE — CARDIAC CATHETERIZATION
Procedure Note


Procedure Date


Aug 20, 2018.





Pre-Procedure Diagnosis


Non STEMI





AUC Score


8





Post-Procedure Diagnosis


Severe CAD, Normal Intracardiac Pressures





Procedure(s) Performed


Coronary Angiography, Left Heart Cath, Bypass Graft Angiography





Cardiologist


Abel





Assistant(s)


Glunt





Estimated Blood Loss


10





Medication(s)


Fentanyl, Heparin, Nicardipine, Nitroglycerin, Versed, Lidocaine 1%





Summary of Findings


Indication: NSTEMI; New LV dysfunction.





Access: 6Fr right radial artery


Catheters: JL4, JR4, TUYET





Findings:


LM - Patent distal stent


LAD - 90% ostial stenosis, 70*80% diffuse proximal in-stent restenosis, mid LAD 

with competitive flow form LIMA; moderate sized 1st diagonal with mild disease.


Circumflex - Patent proximal stent, distal segment and L-PDA with luminal 

irregularities.  OM1 with patent stents and mild distal disease.


Ramus - completely occluded at distal edge of previously placed stent, distal 

vessel fills retrograde via left to left collaterals


RCA - Small non-dominant with patient prior stent in moderate diffuse in-stent 

restenosis





Patent LIMA-LAD.  Distal LAD small without significant disease.





LVEDP - 19





Arterial Closure: TR Band





Summary:


1. Severe multivessel native coronary artery disease (unchanged from prior 

catheterization after POBA 1/2017).


 - Patent LM, proximal circumflex stent


 - Severe proximal LAD in-stent restenosis


 - Occluded ramus stent, fills distally via left to left collaterals


 - Patent OM1 with mild distal disease





2. Patent LIMA - LAD.  Remaining Vein grafts known to be occluded


3. Elevated intracardiac filling pressure





Recommendations:


Continue diuresis for acute heart failure and guideline directed medical 

therapy for cardiomyopathy


Continue DAPT and ASCVD risk factor modification.





Hemodynamics


Rest Ao:


98/47/69


Final Ao:


90/42/66


LV:


92/19





Recommendations


Medical therapy and/or Counseling





Specimens


None





Radiation Exposure (mGy)


2345





Contrast (mls)


110





Fluids (cc crystalloids)


250





Drains


none





Anesthesia


moderate





Procedural Complication(s)


None





Disposition


PCU





ACC Data


Cardiac Status


Clinical evaluation leading to the procedure


CAD Presntation:  Non STEMI


Anginal Classification:  CCS IV


Heart Failure:  Yes, NYHA Class: CCS IV


Cardiogenic Shock w/in 24Hrs:  No


Cardiac Arrest w/in 24Hrs:  No


Imaging studies past 6 months:  Yes


Stress studies past 6 months:  No





Closure Device


Percutaneous Entry Location:  Radial


Closure Device:  Radial Band


Recommendations:  Medical therapy and/or Counseling





Intraprocedure Events


Significant Dissection:  No


Perforation:  No

## 2018-08-21 VITALS
HEART RATE: 78 BPM | TEMPERATURE: 98.96 F | OXYGEN SATURATION: 96 % | SYSTOLIC BLOOD PRESSURE: 135 MMHG | DIASTOLIC BLOOD PRESSURE: 72 MMHG

## 2018-08-21 VITALS
TEMPERATURE: 98.42 F | OXYGEN SATURATION: 97 % | DIASTOLIC BLOOD PRESSURE: 58 MMHG | HEART RATE: 91 BPM | SYSTOLIC BLOOD PRESSURE: 124 MMHG

## 2018-08-21 VITALS
SYSTOLIC BLOOD PRESSURE: 132 MMHG | DIASTOLIC BLOOD PRESSURE: 75 MMHG | TEMPERATURE: 97.7 F | HEART RATE: 84 BPM | OXYGEN SATURATION: 94 %

## 2018-08-21 VITALS
DIASTOLIC BLOOD PRESSURE: 57 MMHG | TEMPERATURE: 98.24 F | SYSTOLIC BLOOD PRESSURE: 94 MMHG | HEART RATE: 78 BPM | OXYGEN SATURATION: 97 %

## 2018-08-21 VITALS
TEMPERATURE: 98.42 F | HEART RATE: 76 BPM | OXYGEN SATURATION: 97 % | DIASTOLIC BLOOD PRESSURE: 73 MMHG | SYSTOLIC BLOOD PRESSURE: 108 MMHG

## 2018-08-21 VITALS
DIASTOLIC BLOOD PRESSURE: 58 MMHG | SYSTOLIC BLOOD PRESSURE: 125 MMHG | OXYGEN SATURATION: 96 % | TEMPERATURE: 98.24 F | HEART RATE: 89 BPM

## 2018-08-21 VITALS — OXYGEN SATURATION: 98 % | HEART RATE: 90 BPM

## 2018-08-21 VITALS
HEART RATE: 86 BPM | OXYGEN SATURATION: 98 % | DIASTOLIC BLOOD PRESSURE: 59 MMHG | TEMPERATURE: 98.78 F | SYSTOLIC BLOOD PRESSURE: 116 MMHG

## 2018-08-21 VITALS
TEMPERATURE: 98.78 F | SYSTOLIC BLOOD PRESSURE: 128 MMHG | DIASTOLIC BLOOD PRESSURE: 76 MMHG | OXYGEN SATURATION: 98 % | HEART RATE: 87 BPM

## 2018-08-21 VITALS
DIASTOLIC BLOOD PRESSURE: 61 MMHG | SYSTOLIC BLOOD PRESSURE: 121 MMHG | OXYGEN SATURATION: 95 % | TEMPERATURE: 98.78 F | HEART RATE: 85 BPM

## 2018-08-21 VITALS
OXYGEN SATURATION: 96 % | SYSTOLIC BLOOD PRESSURE: 124 MMHG | DIASTOLIC BLOOD PRESSURE: 71 MMHG | TEMPERATURE: 98.42 F | HEART RATE: 91 BPM

## 2018-08-21 VITALS
TEMPERATURE: 98.6 F | HEART RATE: 95 BPM | OXYGEN SATURATION: 96 % | SYSTOLIC BLOOD PRESSURE: 130 MMHG | DIASTOLIC BLOOD PRESSURE: 59 MMHG

## 2018-08-21 VITALS
OXYGEN SATURATION: 95 % | HEART RATE: 87 BPM | TEMPERATURE: 98.78 F | DIASTOLIC BLOOD PRESSURE: 47 MMHG | SYSTOLIC BLOOD PRESSURE: 99 MMHG

## 2018-08-21 VITALS — HEART RATE: 85 BPM | OXYGEN SATURATION: 93 %

## 2018-08-21 VITALS
SYSTOLIC BLOOD PRESSURE: 134 MMHG | OXYGEN SATURATION: 96 % | DIASTOLIC BLOOD PRESSURE: 72 MMHG | TEMPERATURE: 98.42 F | HEART RATE: 95 BPM

## 2018-08-21 VITALS
TEMPERATURE: 98.78 F | OXYGEN SATURATION: 98 % | DIASTOLIC BLOOD PRESSURE: 75 MMHG | HEART RATE: 80 BPM | SYSTOLIC BLOOD PRESSURE: 108 MMHG

## 2018-08-21 VITALS — DIASTOLIC BLOOD PRESSURE: 43 MMHG | SYSTOLIC BLOOD PRESSURE: 94 MMHG

## 2018-08-21 VITALS — OXYGEN SATURATION: 97 %

## 2018-08-21 VITALS — HEART RATE: 84 BPM | OXYGEN SATURATION: 95 %

## 2018-08-21 VITALS — OXYGEN SATURATION: 93 % | HEART RATE: 83 BPM

## 2018-08-21 LAB
BUN SERPL-MCNC: 17 MG/DL (ref 7–18)
CALCIUM SERPL-MCNC: 8.3 MG/DL (ref 8.5–10.1)
CO2 SERPL-SCNC: 26 MMOL/L (ref 21–32)
CREAT SERPL-MCNC: 1.23 MG/DL (ref 0.6–1.2)
EOSINOPHIL NFR BLD AUTO: 273 K/UL (ref 130–400)
GLUCOSE SERPL-MCNC: 92 MG/DL (ref 70–99)
HCT VFR BLD CALC: 25 % (ref 37–47)
HGB BLD-MCNC: 7.8 G/DL (ref 12–16)
MCH RBC QN AUTO: 28.6 PG (ref 25–34)
MCHC RBC AUTO-ENTMCNC: 31.2 G/DL (ref 32–36)
MCV RBC AUTO: 91.6 FL (ref 80–100)
PMV BLD AUTO: 8 FL (ref 7.4–10.4)
POTASSIUM SERPL-SCNC: 4 MMOL/L (ref 3.5–5.1)
RED CELL DISTRIBUTION WIDTH CV: 17.2 % (ref 11.5–14.5)
RED CELL DISTRIBUTION WIDTH SD: 56.2 FL (ref 36.4–46.3)
SODIUM SERPL-SCNC: 140 MMOL/L (ref 136–145)
WBC # BLD AUTO: 5.69 K/UL (ref 4.8–10.8)

## 2018-08-21 RX ADMIN — INSULIN ASPART SCH UNITS: 100 INJECTION, SOLUTION INTRAVENOUS; SUBCUTANEOUS at 21:09

## 2018-08-21 RX ADMIN — IPRATROPIUM BROMIDE AND ALBUTEROL SULFATE SCH ML: .5; 3 SOLUTION RESPIRATORY (INHALATION) at 15:27

## 2018-08-21 RX ADMIN — METOPROLOL TARTRATE SCH MG: 50 TABLET, FILM COATED ORAL at 08:36

## 2018-08-21 RX ADMIN — FERROUS SULFATE TAB EC 325 MG (65 MG FE EQUIVALENT) SCH MG: 325 (65 FE) TABLET DELAYED RESPONSE at 08:34

## 2018-08-21 RX ADMIN — LISINOPRIL SCH MG: 2.5 TABLET ORAL at 08:36

## 2018-08-21 RX ADMIN — OXYCODONE HYDROCHLORIDE AND ACETAMINOPHEN SCH MG: 500 TABLET ORAL at 08:35

## 2018-08-21 RX ADMIN — IPRATROPIUM BROMIDE AND ALBUTEROL SULFATE SCH ML: .5; 3 SOLUTION RESPIRATORY (INHALATION) at 11:10

## 2018-08-21 RX ADMIN — METOPROLOL TARTRATE SCH MG: 50 TABLET, FILM COATED ORAL at 21:01

## 2018-08-21 RX ADMIN — Medication SCH MG: at 08:34

## 2018-08-21 RX ADMIN — VANCOMYCIN HYDROCHLORIDE SCH MG: 1 INJECTION, POWDER, LYOPHILIZED, FOR SOLUTION INTRAVENOUS at 23:52

## 2018-08-21 RX ADMIN — PANTOPRAZOLE SCH MG: 40 TABLET, DELAYED RELEASE ORAL at 08:34

## 2018-08-21 RX ADMIN — Medication SCH TAB: at 08:34

## 2018-08-21 RX ADMIN — VANCOMYCIN HYDROCHLORIDE SCH MG: 1 INJECTION, POWDER, LYOPHILIZED, FOR SOLUTION INTRAVENOUS at 17:39

## 2018-08-21 RX ADMIN — Medication SCH ML: at 17:39

## 2018-08-21 RX ADMIN — COLESTIPOL HYDROCHLORIDE SCH GM: 1 TABLET ORAL at 21:53

## 2018-08-21 RX ADMIN — CLOPIDOGREL BISULFATE SCH MG: 75 TABLET, FILM COATED ORAL at 08:34

## 2018-08-21 RX ADMIN — PRIMIDONE SCH MG: 50 TABLET ORAL at 21:02

## 2018-08-21 RX ADMIN — COLESTIPOL HYDROCHLORIDE SCH GM: 1 TABLET ORAL at 10:38

## 2018-08-21 RX ADMIN — Medication SCH MG: at 08:35

## 2018-08-21 RX ADMIN — INSULIN ASPART SCH UNITS: 100 INJECTION, SOLUTION INTRAVENOUS; SUBCUTANEOUS at 08:42

## 2018-08-21 RX ADMIN — IPRATROPIUM BROMIDE AND ALBUTEROL SULFATE SCH ML: .5; 3 SOLUTION RESPIRATORY (INHALATION) at 18:54

## 2018-08-21 RX ADMIN — VANCOMYCIN HYDROCHLORIDE SCH MG: 1 INJECTION, POWDER, LYOPHILIZED, FOR SOLUTION INTRAVENOUS at 11:55

## 2018-08-21 RX ADMIN — ENOXAPARIN SODIUM SCH MG: 40 INJECTION SUBCUTANEOUS at 21:06

## 2018-08-21 RX ADMIN — Medication SCH CAN: at 17:11

## 2018-08-21 RX ADMIN — FERROUS SULFATE TAB EC 325 MG (65 MG FE EQUIVALENT) SCH MG: 325 (65 FE) TABLET DELAYED RESPONSE at 21:00

## 2018-08-21 RX ADMIN — VANCOMYCIN HYDROCHLORIDE SCH MG: 1 INJECTION, POWDER, LYOPHILIZED, FOR SOLUTION INTRAVENOUS at 06:10

## 2018-08-21 RX ADMIN — IPRATROPIUM BROMIDE AND ALBUTEROL SULFATE SCH ML: .5; 3 SOLUTION RESPIRATORY (INHALATION) at 07:00

## 2018-08-21 RX ADMIN — LACTOBACILLUS TAB SCH TAB: TAB at 10:38

## 2018-08-21 RX ADMIN — Medication SCH ML: at 11:55

## 2018-08-21 RX ADMIN — Medication SCH ML: at 23:52

## 2018-08-21 RX ADMIN — ATORVASTATIN CALCIUM SCH MG: 40 TABLET, FILM COATED ORAL at 21:00

## 2018-08-21 RX ADMIN — INSULIN GLARGINE SCH UNITS: 100 INJECTION, SOLUTION SUBCUTANEOUS at 21:08

## 2018-08-21 RX ADMIN — LACTOBACILLUS TAB SCH TAB: TAB at 08:35

## 2018-08-21 RX ADMIN — Medication SCH ML: at 06:10

## 2018-08-21 RX ADMIN — POTASSIUM CHLORIDE SCH MEQ: 1500 TABLET, EXTENDED RELEASE ORAL at 08:43

## 2018-08-21 RX ADMIN — INSULIN ASPART SCH UNITS: 100 INJECTION, SOLUTION INTRAVENOUS; SUBCUTANEOUS at 17:43

## 2018-08-21 RX ADMIN — COLESTIPOL HYDROCHLORIDE SCH GM: 1 TABLET ORAL at 15:20

## 2018-08-21 RX ADMIN — RANITIDINE HYDROCHLORIDE SCH MLS/HR: 25 INJECTION, SOLUTION INTRAMUSCULAR; INTRAVENOUS at 17:39

## 2018-08-21 RX ADMIN — INSULIN GLARGINE SCH UNITS: 100 INJECTION, SOLUTION SUBCUTANEOUS at 08:43

## 2018-08-21 RX ADMIN — INSULIN ASPART SCH UNITS: 100 INJECTION, SOLUTION INTRAVENOUS; SUBCUTANEOUS at 11:55

## 2018-08-21 RX ADMIN — Medication SCH CAN: at 08:37

## 2018-08-21 RX ADMIN — GABAPENTIN SCH MG: 100 CAPSULE ORAL at 21:03

## 2018-08-21 RX ADMIN — RANITIDINE HYDROCHLORIDE SCH MLS/HR: 25 INJECTION, SOLUTION INTRAMUSCULAR; INTRAVENOUS at 23:52

## 2018-08-21 RX ADMIN — POTASSIUM CHLORIDE SCH MEQ: 1500 TABLET, EXTENDED RELEASE ORAL at 21:00

## 2018-08-21 RX ADMIN — LACTOBACILLUS TAB SCH TAB: TAB at 17:12

## 2018-08-21 NOTE — PROGRESS NOTE
Subjective


Date of Service:


Aug 21, 2018.


Subjective


Pt evaluation today including:  conversation w/ patient, conversation w/ family 

( at bedside), physical exam, chart review, lab review, conversation w/ 

consultant (cardiology, wound nurse), review of inpatient medication list


Pain:  none voiced


PO Intake:  improving


Voiding:  goodwin catheter in place


tele with NSR, PVCs; no sustained dysrhythmia





no BM in over 36 hours


no nausea/emesis 





c/o palpitations/fluttering of heart


some heartburn symptoms as well 





no dyspnea, orthopnea, PND 





still quite anxious; worrying about multiple issues





Review of Systems


Constitutional:  No fever, No chills


Respiratory:  No cough


Cardiac:  No chest pain, No orthopnea


Abdomen:  No pain, No nausea, No vomiting





Objective


Vital Signs











  Date Time  Temp Pulse Resp B/P (MAP) Pulse Ox O2 Delivery O2 Flow Rate FiO2


 


8/21/18 18:54  90 16  98 Room Air  


 


8/21/18 18:12 37.1 85 18 121/61 95   


 


8/21/18 17:12 36.9 91 18 124/71 96   


 


8/21/18 16:42 36.9 95 18 134/72 96   


 


8/21/18 16:27 36.8 89 18 125/58 96   


 


8/21/18 16:00     97 Room Air  


 


8/21/18 15:27  84 18  95 Room Air  


 


8/21/18 15:13 36.9 91 20 124/58 (80) 97 Room Air  


 


8/21/18 11:44 37.1 87 18 99/47 (64) 95 Room Air  


 


8/21/18 11:10  85 18  93 Room Air  


 


8/21/18 08:38    94/43 (60)    


 


8/21/18 08:30      Room Air  


 


8/21/18 07:02  83 18  93 Room Air  


 


8/21/18 06:40 36.8 78 18 94/57 (69) 97 Room Air  


 


8/21/18 00:00      Room Air  


 


8/20/18 23:40 36.8 88 20 99/46 (63) 96 Room Air  


 


8/20/18 21:45  86 20 110/73 (85) 98 Room Air  


 


8/20/18 20:45 36.8 92 20 114/63 (80) 95 Room Air  


 


8/20/18 20:00 37.0 95 20 94/53 (67) 93 Room Air  


 


8/20/18 19:45 37.0 95 20 94/53 (67) 93 Room Air  











Physical Exam


General Appearance:  no apparent distress, + obese, + pertinent finding (looks 

older than stated age )


ENT:  pharynx normal


Neck:  no JVD


Respiratory/Chest:  lungs clear, no respiratory distress, no accessory muscle 

use, + rales (scant - bases)


Cardiovascular:  regular rate, rhythm, no gallop, no murmur, + extra beats


Abdomen:  normal bowel sounds, non tender, soft, no organomegaly


Extremities:  + pedal edema (improved; <1+ b/l )


Neurologic/Psychiatric:  alert, oriented x 3, + pertinent finding (very, very 

anxious)


Skin:  + pallor





Laboratory Results





Last 24 Hours








Test


  8/20/18


20:52 8/21/18


05:16 8/21/18


07:29 8/21/18


11:35


 


Bedside Glucose 132 mg/dl   119 mg/dl  163 mg/dl 


 


White Blood Count  5.69 K/uL   


 


Red Blood Count  2.73 M/uL   


 


Hemoglobin  7.8 g/dL   


 


Hematocrit  25.0 %   


 


Mean Corpuscular Volume  91.6 fL   


 


Mean Corpuscular Hemoglobin  28.6 pg   


 


Mean Corpuscular Hemoglobin


Concent 


  31.2 g/dl 


  


  


 


 


RDW Standard Deviation  56.2 fL   


 


RDW Coefficient of Variation  17.2 %   


 


Platelet Count  273 K/uL   


 


Mean Platelet Volume  8.0 fL   


 


Sodium Level  140 mmol/L   


 


Potassium Level  4.0 mmol/L   


 


Chloride Level  104 mmol/L   


 


Carbon Dioxide Level  26 mmol/L   


 


Anion Gap  10.0 mmol/L   


 


Blood Urea Nitrogen  17 mg/dl   


 


Creatinine  1.23 mg/dl   


 


Est Creatinine Clear Calc


Drug Dose 


  50.3 ml/min 


  


  


 


 


Estimated GFR (


American) 


  55.2 


  


  


 


 


Estimated GFR (Non-


American 


  47.6 


  


  


 


 


BUN/Creatinine Ratio  13.9   


 


Random Glucose  92 mg/dl   


 


Calcium Level  8.3 mg/dl   


 


Test


  8/21/18


16:19 


  


  


 


 


Bedside Glucose 123 mg/dl    











Assessment and Plan


61yo female with history of CAD s/p CABG in 2000, HTN, T2DM, morbid obesity, 

long-standing asthma, prior tobacco dependence (50 pack years), and recent month

-long hospitalization at [facility] in [place] for Rima'

s gangrene of the right thigh/groin who presented as a transfer from [facility] due to concerns for ACS and acute CHF.  








1.  positive troponin - likely from myocardial demand ischemia rather than ACS.

  Demand ischemia likely from acute CHF. 


s/p cath yesterday - findings were similar to previous heart cath. 


Will continue her chronic CAD meds including BB, asa, plavix, statin, etc. 





2.  acute systolic CHF - markedly improved. 


Resume diuresis - 20mg IV x 1 now, then additional lasix in between units 1 and 

2. 


Fluid restrict to 1500cc/day.  


Try to improve blood albumin level w/ supplements.  


Continue BB; added low-dose ACE (lisinopril 2.5mg daily) but we may be limited 

with BP.


We are awaiting records from [facility] to see what prior echo showed. 


Daily BMP while here. 


Suspect systolic CHF is due to ischemic cardiomyopathy from prior MI event.





3.  edema - likely due to #2.  Dopplers of b/l LEs neg for DVT.  TSH was 

largely normal.  Low albumin certainly isn't helping her edema either. 





4.  recent Rima's gangrene of right groin/thigh - appreciate Wound Care 

Team consultation.  s/p woundvac placement today.  Added MVI/zinc/Vit C to help 

promote wound healing.  Added boost glucose control as well. 





5.  T2DM - improved with adjustments in lantus + novolog.  No changes today. 





6.  h/o asthma - suspect she likely has COPD due to long-standing tobacco 

dependence - not in exacerbation at this time.  Nebs/inhalers prn for symptoms.

  





7.  morbid obesity - BMI 41.





8.  HTN - controlled; continue home medications.  Added low-dose ACE for 

systolic CHF but BP is low and we may need to hold if BPs stay <90.





9.  CAD - continue BB, statin, asa, plavix.  See discussion above. 





10.  hyperlipidemia - statin. 





11.  DVT proph - lovenox once daily. 





12.  hypoalbuminemia / protein calorie malnutrition - MVI, vit C, zinc, and 

boost glucose control. 





13.  anemia - likely due to chronic disease - b12, folate wnl.  Iron studies 

most c/w ACD but could have low grade Fe def.  Will supplement.  


Given her severe CAD, CHF, and pulmonary disease will transfuse 2 units PRBCs, 

each over 3 hours, with lasix IV in between units.


CBC in am.   





14.  FEN - AHA/DM diet.


Fluid restrict 1500cc/day. 


BMP am. 





15.  low-normal BPs at times - checked cortisol level and wnl.   





16.  c diff colitis - cont vancomycin; records from [facility] state her 

stop date is 8/24/18.  Cont lactinex TID.  Colestipol 1gm TID. 


Overall improving/resolved clinically. 





17.  h/o tobacco dependence - quit 1 month ago. 





18.  PVCs - beta blocker, reassurance.  





19.  severe anxiety - start buspar 7.5mg BID. 





20.  ?GERD - start zantac 50mg IV q8h.








PT, OT evals appreciated


 updated once again at bedside


oob to chair BID 


dispo - SNF for rehab


Continued [facility] stay due to:  voiding difficulties, ambulation difficulties, 

multiple IV medications needed, other (PRBC infusion)


Discharge planning:  skilled nursing facility

## 2018-08-21 NOTE — CARDIOLOGY FOLLOW-UP
Subjective


Subjective


Date of Service:


Aug 21, 2018.


Pt evaluation today including:  conversation w/ patient, conversation w/ family

, physical exam, chart review, lab review, review of studies, conversation w/ 

consultant, review of inpatient medication list


Additional Details:


No recurrent chest pain.


Breathing improved.  





Tele reviewed -- no events.





Review of Systems


Constitutional:  No fever, No chills


Respiratory:  No cough, No shortness of breath


Cardiac:  + edema


Abdomen:  + vomiting





Objective


Vital Signs





Last Vital Signs Documentation








  Date Time  Temp Pulse Resp B/P (MAP) Pulse Ox O2 Delivery O2 Flow Rate FiO2


 


8/21/18 08:38    94/43 (60)    


 


8/21/18 07:02  83 18  93 Room Air  


 


8/21/18 06:40 36.8       


 


8/19/18 16:00       3.0 


 


8/18/18 19:55        100











Physical Exam:


General Appearance:  no apparent distress, + obese


ENT:  pharynx normal


Neck:  no JVD


Respiratory/Chest:  lungs clear, no accessory muscle use


Cardiovascular:  regular rate, rhythm, no gallop, no murmur


Abdomen:  normal bowel sounds, soft


Extremities:  + pedal edema (Minimal), + pertinent finding (right groin wound --

clean, pain base with no surrounding erythema or induration)


Neurologic/Psychiatric:  alert, oriented x 3, + depressed affect


Skin:  normal color, warm/dry





Assessment and Plan


1. Multi-vessel CAD/mildly elevated troponin


2. Acute systolic heart failure/ICM


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


4. DM2


5. Anemia





Cardiac catheterization yesterday showed stable disease with patent LIMA to LAD 

and left main/circumflex stents.


Today patient well perfused with only minimal residual congestion.  Renal 

function stable.


No apparent procedural access site complications.





--discussed with Dr. Mcarthur, agree with transfusion today


--IV Lasix after PRBCs


--likely transition to p.o. diuretics tomorrow


--continue current DAPT


-- Continue current beta-blocker; and ACE-inhibitor


-- Continue statin





From a cardiac standpoint potentially okay for discharge tomorrow.


Continued [facility] stay due to:  voiding difficulties, ambulation difficulties, 

multiple IV medications needed, other (probable need for heart cath)


Discharge planning:  uncertain


Medications:





Current Inpatient Medications








 Medications


  (Trade)  Dose


 Ordered  Sig/Patrick


 Route  Start Time


 Stop Time Status Last Admin


Dose Admin


 


 Enoxaparin Sodium


  (Lovenox Inj)  40 mg  HS


 SC  8/17/18 22:00


 9/16/18 21:59  8/20/18 21:03


40 MG


 


 Acetaminophen


  (Tylenol Tab)  650 mg  Q4H  PRN


 PO  8/17/18 17:45


 9/16/18 17:44   


 


 


 Magnesium


 Hydroxide


  (Milk Of


 Magnesia Susp)  30 ml  Q12H  PRN


 PO  8/17/18 17:45


 9/16/18 17:44   


 


 


 Ondansetron HCl


  (Zofran Inj)  4 mg  Q6H  PRN


 IV  8/17/18 17:45


 9/16/18 17:44  8/19/18 14:13


4 MG


 


 Nitroglycerin


  (Nitrostat Tab)  0.4 mg  UD  PRN


 SL  8/17/18 17:45


 9/16/18 17:44   


 


 


 Aspirin


  (Ecotrin Tab)  81 mg  QAM


 PO  8/18/18 09:00


 9/17/18 08:59  8/21/18 08:35


81 MG


 


 Atorvastatin


 Calcium


  (Lipitor Tab)  40 mg  HS


 PO  8/17/18 21:00


 9/16/18 20:59  8/20/18 21:00


40 MG


 


 Clopidogrel


 Bisulfate


  (plAVix TAB)  75 mg  DAILY


 PO  8/18/18 09:00


 9/17/18 08:59  8/21/18 08:34


75 MG


 


 Metoprolol


 Tartrate


  (Lopressor Tab)  50 mg  BID


 PO  8/17/18 21:00


 9/16/18 20:59  8/20/18 21:01


50 MG


 


 Pantoprazole


 Sodium


  (Protonix Tab)  40 mg  QAM


 PO  8/18/18 09:00


 9/17/18 08:59  8/21/18 08:34


40 MG


 


 Vancomycin HCl


  (Vancomycin Oral


 Soln)  125 mg  Q6


 PO  8/17/18 18:30


 8/27/18 18:29  8/21/18 11:55


125 MG


 


 Lactobacillus


 Acidophilus


  (Floranex Tab)  4 tab  TIDM


 PO  8/18/18 07:30


 9/17/18 07:29  8/21/18 10:38


4 TAB


 


 Insulin Aspart


  (novoLOG ASPART)  **SLIDING


 SCALE**


 **G...  ACHS


 SC  8/17/18 21:00


 9/16/18 20:59  8/21/18 08:42


4 UNITS


 


 Primidone


  (Mysoline Tab)  50 mg  HS


 PO  8/17/18 21:00


 9/16/18 20:59  8/20/18 21:01


50 MG


 


 Gabapentin


  (Neurontin Cap)  200 mg  HS


 PO  8/17/18 21:00


 9/16/18 20:59  8/20/18 21:02


200 MG


 


 Tramadol HCl


  (Ultram Tab)  50 mg  Q6H  PRN


 PO  8/17/18 18:00


 9/16/18 17:59  8/20/18 21:10


50 MG


 


 Glucose


  (Glucose 40% Gel)  15-30


 GRAMS 15


 GRAMS...  UD  PRN


 PO  8/17/18 18:30


 9/16/18 18:29   


 


 


 Glucose


  (Glucose Chew


 Tab)  4-8


 Tablets 4


 Tabl...  UD  PRN


 PO  8/17/18 18:30


 9/16/18 18:29   


 


 


 Dextrose


  (Dextrose 50%


 50ML Syringe)  25-50ML


 25ML FOR


 ...  UD  PRN


 IV  8/17/18 18:30


 9/16/18 18:29   


 


 


 Glucagon


  (Glucagon Inj)  1 mg  UD  PRN


 IM  8/17/18 18:30


 9/16/18 18:29   


 


 


 Carbohydrates


  (Carbohydrates


 For Hypoglycemia)  15-30 GRAMS


 15 grams if


 BSG 54-69...  UD  PRN


 PO  8/17/18 18:30


 9/16/18 18:29   


 


 


 Raspberry


  (Raspberry Syrup


 5ml Cup)  5 ml  Q6


 PO  8/17/18 18:30


 8/31/18 18:29  8/21/18 11:55


5 ML


 


 Albuterol/


 Ipratropium


  (Duoneb)  3 ml  QIDR


 INH  8/18/18 08:00


 9/17/18 07:59  8/21/18 11:10


3 ML


 


 Multivitamins/


 Minerals


  (Multivitamin W/


 Minerals Tab)  1 tab  QAM


 PO  8/19/18 09:00


 9/18/18 08:59  8/21/18 08:34


1 TAB


 


 Zinc Sulfate


  (Zinc Sulfate


 Cap)  220 mg  QAM


 PO  8/19/18 09:00


 9/18/18 08:59  8/21/18 08:34


220 MG


 


 Ascorbic Acid


  (Vitamin C Tab)  500 mg  QAM


 PO  8/19/18 09:00


 9/18/18 08:59  8/21/18 08:35


500 MG


 


 Ferrous Sulfate


  (Feosol Tab)  325 mg  BID


 PO  8/18/18 09:45


 9/17/18 09:44  8/21/18 08:34


325 MG


 


 Potassium Chloride


  (Klor-Con Tab)  20 meq  BID


 PO  8/18/18 09:45


 9/17/18 09:44  8/20/18 21:00


20 MEQ


 


 Enteral


 Nutritional


 Formula


  (Boost Glucose


 Control)  1 can  BIDM


 PO  8/18/18 16:45


 9/17/18 16:44  8/21/18 08:37


1 CAN


 


 Furosemide 40 mg/


 Syringe  4 ml @ 4


 mls/min  QAM


 IV  8/19/18 09:00


 9/18/18 08:59 Future Hold 8/20/18 09:27


4 MLS/MIN


 


 Insulin Glargine


  (Lantus Solostar


 Pen)  20 units  BID


 SC  8/19/18 09:00


 9/16/18 20:59  8/21/18 08:43


20 UNITS


 


 Colestipol HCl


  (Colestid Tab)  1 gm  TID@1000,1500,2200


 PO  8/19/18 15:00


 9/18/18 14:59  8/21/18 10:38


1 GM


 


 Lisinopril


  (Zestril Tab)  2.5 mg  QAM


 PO  8/20/18 09:00


 9/18/18 08:59  8/20/18 09:29


2.5 MG


 


 Buspirone HCl


  (BusPAR TAB)  7.5 mg  BID


 PO  8/21/18 21:00


 9/20/18 20:59   


 








Lab Results:


8/21/18 05:16








8/21/18 05:16

















Test


  8/21/18


05:16 8/21/18


11:35


 


Red Blood Count


  2.73 M/uL


(4.2-5.4) 


 


 


Mean Corpuscular Volume


  91.6 fL


() 


 


 


Mean Corpuscular Hemoglobin


  28.6 pg


(25-34) 


 


 


Mean Corpuscular Hemoglobin


Concent 31.2 g/dl


(32-36) 


 


 


RDW Standard Deviation


  56.2 fL


(36.4-46.3) 


 


 


RDW Coefficient of Variation


  17.2 %


(11.5-14.5) 


 


 


Mean Platelet Volume


  8.0 fL


(7.4-10.4) 


 


 


Anion Gap


  10.0 mmol/L


(3-11) 


 


 


Est Creatinine Clear Calc


Drug Dose 50.3 ml/min 


  


 


 


Estimated GFR (


American) 55.2 


  


 


 


Estimated GFR (Non-


American 47.6 


  


 


 


BUN/Creatinine Ratio 13.9 (10-20)  


 


Calcium Level


  8.3 mg/dl


(8.5-10.1) 


 


 


Bedside Glucose


  


  163 mg/dl


(70-90)

## 2018-08-21 NOTE — CLINICAL DOCUMENTATION QUERY
ANNE Kaufman :



**** CLINICAL DOCUMENTATION QUERY****



Patient is a 62 year old female admitted for evaluation of chest tightness, elevated 
troponin, ongoing shortness of breath, and lower extremity edema. Noted estimated GFR 
range of 45-58 ml/min from 1/11/17 to present per EMR.  As appropriate, consider capture 
of this clinical information as suggested below as this impacts accurate DRG assignment.  
Thank you.



In your clinical opinion is this patient being managed for:

    

                        (  ) Chronic kidney disease, stage 3 

                        (  ) Not Agree



                        (xx  ) Other explanation of clinical findings (No explanation is 
considered a No Response)

Wouldn't her GFR place her CKD stage 2?  

Please let me know - Anne pink 



                        (  ) Unable to determine 

                        (  ) Need to Discuss (Phone CDS or qliq) (No discussion is 
considered a No Response)

                                             

The medical record reflects the following clinical findings, treatment, and risk factors.  
  



Clinical Indicators: As above

Treatment: Monitoring with serial chemistries

Risk Factors: Age, hypertension, obesity



Please clarify and document your clinical opinion in the progress notes and discharge 
summary. Terms such as "probable", "suspected", "likely", "questionable", "possible", or 
"still to be ruled out" are acceptable. 



*****IF IN AGREEMENT, YOU MUST DOCUMENT ABOVE DIAGNOSTIC STATEMENT IN DAILY PROGRESS NOTES 
AND DISCHARGE SUMMARY. This document is not part of the patient's record.*****

Thank You, Ancelmo Crockett -[phone]

## 2018-08-22 VITALS
DIASTOLIC BLOOD PRESSURE: 52 MMHG | SYSTOLIC BLOOD PRESSURE: 99 MMHG | TEMPERATURE: 98.06 F | OXYGEN SATURATION: 96 % | HEART RATE: 88 BPM

## 2018-08-22 VITALS — OXYGEN SATURATION: 99 % | HEART RATE: 89 BPM

## 2018-08-22 VITALS
OXYGEN SATURATION: 100 % | DIASTOLIC BLOOD PRESSURE: 74 MMHG | SYSTOLIC BLOOD PRESSURE: 125 MMHG | TEMPERATURE: 98.78 F | HEART RATE: 77 BPM

## 2018-08-22 VITALS — HEART RATE: 87 BPM | OXYGEN SATURATION: 99 %

## 2018-08-22 VITALS
DIASTOLIC BLOOD PRESSURE: 63 MMHG | SYSTOLIC BLOOD PRESSURE: 117 MMHG | OXYGEN SATURATION: 97 % | HEART RATE: 71 BPM | TEMPERATURE: 98.42 F

## 2018-08-22 VITALS — DIASTOLIC BLOOD PRESSURE: 71 MMHG | SYSTOLIC BLOOD PRESSURE: 127 MMHG | OXYGEN SATURATION: 97 % | HEART RATE: 88 BPM

## 2018-08-22 VITALS
OXYGEN SATURATION: 97 % | SYSTOLIC BLOOD PRESSURE: 120 MMHG | TEMPERATURE: 98.42 F | DIASTOLIC BLOOD PRESSURE: 73 MMHG | HEART RATE: 82 BPM

## 2018-08-22 VITALS — OXYGEN SATURATION: 100 % | HEART RATE: 85 BPM

## 2018-08-22 VITALS
TEMPERATURE: 98.06 F | HEART RATE: 90 BPM | OXYGEN SATURATION: 94 % | SYSTOLIC BLOOD PRESSURE: 105 MMHG | DIASTOLIC BLOOD PRESSURE: 51 MMHG

## 2018-08-22 VITALS — TEMPERATURE: 98.78 F

## 2018-08-22 VITALS — HEART RATE: 92 BPM | OXYGEN SATURATION: 95 %

## 2018-08-22 VITALS — OXYGEN SATURATION: 96 % | HEART RATE: 88 BPM

## 2018-08-22 LAB
BUN SERPL-MCNC: 16 MG/DL (ref 7–18)
CALCIUM SERPL-MCNC: 8.8 MG/DL (ref 8.5–10.1)
CO2 SERPL-SCNC: 25 MMOL/L (ref 21–32)
CREAT SERPL-MCNC: 1.13 MG/DL (ref 0.6–1.2)
EOSINOPHIL NFR BLD AUTO: 262 K/UL (ref 130–400)
GLUCOSE SERPL-MCNC: 111 MG/DL (ref 70–99)
HCT VFR BLD CALC: 34.8 % (ref 37–47)
HGB BLD-MCNC: 11.4 G/DL (ref 12–16)
MCH RBC QN AUTO: 29.7 PG (ref 25–34)
MCHC RBC AUTO-ENTMCNC: 32.8 G/DL (ref 32–36)
MCV RBC AUTO: 90.6 FL (ref 80–100)
PMV BLD AUTO: 8.5 FL (ref 7.4–10.4)
POTASSIUM SERPL-SCNC: 3.9 MMOL/L (ref 3.5–5.1)
RED CELL DISTRIBUTION WIDTH CV: 16.5 % (ref 11.5–14.5)
RED CELL DISTRIBUTION WIDTH SD: 52.6 FL (ref 36.4–46.3)
SODIUM SERPL-SCNC: 140 MMOL/L (ref 136–145)
WBC # BLD AUTO: 6.82 K/UL (ref 4.8–10.8)

## 2018-08-22 RX ADMIN — LACTOBACILLUS TAB SCH TAB: TAB at 08:57

## 2018-08-22 RX ADMIN — INSULIN GLARGINE SCH UNITS: 100 INJECTION, SOLUTION SUBCUTANEOUS at 09:06

## 2018-08-22 RX ADMIN — RANITIDINE HYDROCHLORIDE SCH MLS/HR: 25 INJECTION, SOLUTION INTRAMUSCULAR; INTRAVENOUS at 16:45

## 2018-08-22 RX ADMIN — LACTOBACILLUS TAB SCH TAB: TAB at 16:42

## 2018-08-22 RX ADMIN — IPRATROPIUM BROMIDE AND ALBUTEROL SULFATE SCH ML: .5; 3 SOLUTION RESPIRATORY (INHALATION) at 11:12

## 2018-08-22 RX ADMIN — POTASSIUM CHLORIDE SCH MEQ: 1500 TABLET, EXTENDED RELEASE ORAL at 08:57

## 2018-08-22 RX ADMIN — IPRATROPIUM BROMIDE AND ALBUTEROL SULFATE SCH ML: .5; 3 SOLUTION RESPIRATORY (INHALATION) at 15:05

## 2018-08-22 RX ADMIN — COLESTIPOL HYDROCHLORIDE SCH GM: 1 TABLET ORAL at 15:47

## 2018-08-22 RX ADMIN — Medication SCH ML: at 12:15

## 2018-08-22 RX ADMIN — Medication SCH ML: at 18:03

## 2018-08-22 RX ADMIN — Medication SCH ML: at 05:50

## 2018-08-22 RX ADMIN — INSULIN ASPART SCH UNITS: 100 INJECTION, SOLUTION INTRAVENOUS; SUBCUTANEOUS at 09:06

## 2018-08-22 RX ADMIN — ISOSORBIDE MONONITRATE SCH MG: 30 TABLET, EXTENDED RELEASE ORAL at 08:55

## 2018-08-22 RX ADMIN — PANTOPRAZOLE SCH MG: 40 TABLET, DELAYED RELEASE ORAL at 08:57

## 2018-08-22 RX ADMIN — LISINOPRIL SCH MG: 2.5 TABLET ORAL at 08:56

## 2018-08-22 RX ADMIN — RANITIDINE HYDROCHLORIDE SCH MLS/HR: 25 INJECTION, SOLUTION INTRAMUSCULAR; INTRAVENOUS at 23:48

## 2018-08-22 RX ADMIN — ATORVASTATIN CALCIUM SCH MG: 40 TABLET, FILM COATED ORAL at 20:17

## 2018-08-22 RX ADMIN — ALPRAZOLAM PRN MG: 0.25 TABLET ORAL at 08:10

## 2018-08-22 RX ADMIN — PRIMIDONE SCH MG: 50 TABLET ORAL at 20:17

## 2018-08-22 RX ADMIN — Medication SCH MG: at 08:56

## 2018-08-22 RX ADMIN — VANCOMYCIN HYDROCHLORIDE SCH MG: 1 INJECTION, POWDER, LYOPHILIZED, FOR SOLUTION INTRAVENOUS at 23:46

## 2018-08-22 RX ADMIN — METOPROLOL TARTRATE SCH MG: 50 TABLET, FILM COATED ORAL at 08:57

## 2018-08-22 RX ADMIN — ENOXAPARIN SODIUM SCH MG: 40 INJECTION SUBCUTANEOUS at 20:18

## 2018-08-22 RX ADMIN — INSULIN ASPART SCH UNITS: 100 INJECTION, SOLUTION INTRAVENOUS; SUBCUTANEOUS at 16:44

## 2018-08-22 RX ADMIN — LACTOBACILLUS TAB SCH TAB: TAB at 12:16

## 2018-08-22 RX ADMIN — FERROUS SULFATE TAB EC 325 MG (65 MG FE EQUIVALENT) SCH MG: 325 (65 FE) TABLET DELAYED RESPONSE at 21:14

## 2018-08-22 RX ADMIN — CLOPIDOGREL BISULFATE SCH MG: 75 TABLET, FILM COATED ORAL at 08:57

## 2018-08-22 RX ADMIN — GABAPENTIN SCH MG: 100 CAPSULE ORAL at 20:18

## 2018-08-22 RX ADMIN — VANCOMYCIN HYDROCHLORIDE SCH MG: 1 INJECTION, POWDER, LYOPHILIZED, FOR SOLUTION INTRAVENOUS at 05:50

## 2018-08-22 RX ADMIN — BUSPIRONE HYDROCHLORIDE SCH MG: 15 TABLET ORAL at 20:18

## 2018-08-22 RX ADMIN — BUSPIRONE HYDROCHLORIDE SCH MG: 15 TABLET ORAL at 08:55

## 2018-08-22 RX ADMIN — INSULIN ASPART SCH UNITS: 100 INJECTION, SOLUTION INTRAVENOUS; SUBCUTANEOUS at 12:15

## 2018-08-22 RX ADMIN — IPRATROPIUM BROMIDE AND ALBUTEROL SULFATE SCH ML: .5; 3 SOLUTION RESPIRATORY (INHALATION) at 06:59

## 2018-08-22 RX ADMIN — POTASSIUM CHLORIDE SCH MEQ: 1500 TABLET, EXTENDED RELEASE ORAL at 20:17

## 2018-08-22 RX ADMIN — VANCOMYCIN HYDROCHLORIDE SCH MG: 1 INJECTION, POWDER, LYOPHILIZED, FOR SOLUTION INTRAVENOUS at 18:03

## 2018-08-22 RX ADMIN — INSULIN GLARGINE SCH UNITS: 100 INJECTION, SOLUTION SUBCUTANEOUS at 20:20

## 2018-08-22 RX ADMIN — Medication SCH TAB: at 08:57

## 2018-08-22 RX ADMIN — Medication SCH ML: at 23:46

## 2018-08-22 RX ADMIN — INSULIN ASPART SCH UNITS: 100 INJECTION, SOLUTION INTRAVENOUS; SUBCUTANEOUS at 20:16

## 2018-08-22 RX ADMIN — COLESTIPOL HYDROCHLORIDE SCH GM: 1 TABLET ORAL at 10:36

## 2018-08-22 RX ADMIN — FERROUS SULFATE TAB EC 325 MG (65 MG FE EQUIVALENT) SCH MG: 325 (65 FE) TABLET DELAYED RESPONSE at 08:56

## 2018-08-22 RX ADMIN — VANCOMYCIN HYDROCHLORIDE SCH MG: 1 INJECTION, POWDER, LYOPHILIZED, FOR SOLUTION INTRAVENOUS at 12:16

## 2018-08-22 RX ADMIN — METOPROLOL TARTRATE SCH MG: 50 TABLET, FILM COATED ORAL at 20:17

## 2018-08-22 RX ADMIN — Medication SCH CAN: at 16:42

## 2018-08-22 RX ADMIN — COLESTIPOL HYDROCHLORIDE SCH GM: 1 TABLET ORAL at 20:17

## 2018-08-22 RX ADMIN — OXYCODONE HYDROCHLORIDE AND ACETAMINOPHEN SCH MG: 500 TABLET ORAL at 08:56

## 2018-08-22 RX ADMIN — RANITIDINE HYDROCHLORIDE SCH MLS/HR: 25 INJECTION, SOLUTION INTRAMUSCULAR; INTRAVENOUS at 08:57

## 2018-08-22 RX ADMIN — Medication SCH CAN: at 08:55

## 2018-08-22 RX ADMIN — IPRATROPIUM BROMIDE AND ALBUTEROL SULFATE SCH ML: .5; 3 SOLUTION RESPIRATORY (INHALATION) at 18:56

## 2018-08-22 NOTE — CLINICAL DOCUMENTATION QUERY
Dr. Garcia,



This is the reference provided by our software.



     



































     



































The Stages of Chronic Renal Failure  



The ICD-10-CM classifies CKD based on severity. The severity of CKD is designated by 
stages 1-5. 



*Stage 2, code N18.2, equates to mild CKD; 

*Stage 3, code N18.3, equates to moderate CKD; 

*Stage 4, code N18.4, equates to severe CKD. 

*Code N18.6, End stage renal disease (ESRD), is assigned when the provider has documented 
end-stage-renal disease (ESRD). 



If both a stage of CKD and ESRD are documented, assign code N18.6 only. 



Stage



GFR



Stage I GFR >90 

Stage II GFR 60-89 

Stage III GFR 30-59 

Stage IV GFR 15-29 

Stage V GFR <15

## 2018-08-22 NOTE — DIAGNOSTIC IMAGING REPORT
CHEST ONE VIEW PORTABLE



CLINICAL HISTORY: Shortness of breath    



COMPARISON STUDY:  8/17/2018



FINDINGS: The heart remains enlarged. There are postsurgical changes of a

midline sternotomy. There is mild elevation of the interstitium consistent with

mild pulmonary vascular congestion. There is no focal pulmonary consolidation.

No significant pleural effusions are visualized.[ 



IMPRESSION: Cardiomegaly with radiographic evidence of mild congestive

failure/fluid overload. No significant change from the prior study.







Electronically signed by:  Lauri Loya M.D.

8/22/2018 6:47 AM



Dictated Date/Time:  8/22/2018 6:46 AM

## 2018-08-23 VITALS
SYSTOLIC BLOOD PRESSURE: 102 MMHG | TEMPERATURE: 98.42 F | HEART RATE: 89 BPM | OXYGEN SATURATION: 95 % | DIASTOLIC BLOOD PRESSURE: 54 MMHG

## 2018-08-23 VITALS
HEART RATE: 80 BPM | DIASTOLIC BLOOD PRESSURE: 61 MMHG | OXYGEN SATURATION: 96 % | SYSTOLIC BLOOD PRESSURE: 106 MMHG | TEMPERATURE: 97.88 F

## 2018-08-23 VITALS
OXYGEN SATURATION: 95 % | TEMPERATURE: 97.88 F | SYSTOLIC BLOOD PRESSURE: 128 MMHG | HEART RATE: 95 BPM | DIASTOLIC BLOOD PRESSURE: 59 MMHG

## 2018-08-23 VITALS
HEART RATE: 80 BPM | SYSTOLIC BLOOD PRESSURE: 99 MMHG | DIASTOLIC BLOOD PRESSURE: 53 MMHG | TEMPERATURE: 98.06 F | OXYGEN SATURATION: 93 %

## 2018-08-23 VITALS — DIASTOLIC BLOOD PRESSURE: 53 MMHG | SYSTOLIC BLOOD PRESSURE: 117 MMHG

## 2018-08-23 VITALS
TEMPERATURE: 97.88 F | HEART RATE: 92 BPM | OXYGEN SATURATION: 95 % | SYSTOLIC BLOOD PRESSURE: 119 MMHG | DIASTOLIC BLOOD PRESSURE: 58 MMHG

## 2018-08-23 VITALS — HEART RATE: 89 BPM | OXYGEN SATURATION: 96 %

## 2018-08-23 VITALS — HEART RATE: 88 BPM | OXYGEN SATURATION: 96 %

## 2018-08-23 VITALS
OXYGEN SATURATION: 95 % | SYSTOLIC BLOOD PRESSURE: 113 MMHG | DIASTOLIC BLOOD PRESSURE: 54 MMHG | TEMPERATURE: 98.42 F | HEART RATE: 80 BPM

## 2018-08-23 VITALS — OXYGEN SATURATION: 96 % | HEART RATE: 92 BPM

## 2018-08-23 LAB
BUN SERPL-MCNC: 16 MG/DL (ref 7–18)
CALCIUM SERPL-MCNC: 8.8 MG/DL (ref 8.5–10.1)
CO2 SERPL-SCNC: 27 MMOL/L (ref 21–32)
CREAT SERPL-MCNC: 1.19 MG/DL (ref 0.6–1.2)
EOSINOPHIL NFR BLD AUTO: 228 K/UL (ref 130–400)
GLUCOSE SERPL-MCNC: 94 MG/DL (ref 70–99)
HCT VFR BLD CALC: 36.9 % (ref 37–47)
HGB BLD-MCNC: 11.8 G/DL (ref 12–16)
MCH RBC QN AUTO: 29.2 PG (ref 25–34)
MCHC RBC AUTO-ENTMCNC: 32 G/DL (ref 32–36)
MCV RBC AUTO: 91.3 FL (ref 80–100)
PMV BLD AUTO: 8.2 FL (ref 7.4–10.4)
POTASSIUM SERPL-SCNC: 3.8 MMOL/L (ref 3.5–5.1)
RED CELL DISTRIBUTION WIDTH CV: 16.9 % (ref 11.5–14.5)
RED CELL DISTRIBUTION WIDTH SD: 54.9 FL (ref 36.4–46.3)
SODIUM SERPL-SCNC: 139 MMOL/L (ref 136–145)
WBC # BLD AUTO: 7.69 K/UL (ref 4.8–10.8)

## 2018-08-23 RX ADMIN — Medication SCH ML: at 16:46

## 2018-08-23 RX ADMIN — ATORVASTATIN CALCIUM SCH MG: 40 TABLET, FILM COATED ORAL at 22:02

## 2018-08-23 RX ADMIN — Medication SCH ML: at 11:55

## 2018-08-23 RX ADMIN — FERROUS SULFATE TAB EC 325 MG (65 MG FE EQUIVALENT) SCH MG: 325 (65 FE) TABLET DELAYED RESPONSE at 21:51

## 2018-08-23 RX ADMIN — ENOXAPARIN SODIUM SCH MG: 40 INJECTION SUBCUTANEOUS at 21:55

## 2018-08-23 RX ADMIN — OXYCODONE HYDROCHLORIDE AND ACETAMINOPHEN SCH MG: 500 TABLET ORAL at 08:39

## 2018-08-23 RX ADMIN — METOPROLOL TARTRATE SCH MG: 50 TABLET, FILM COATED ORAL at 08:39

## 2018-08-23 RX ADMIN — LACTOBACILLUS TAB SCH TAB: TAB at 08:38

## 2018-08-23 RX ADMIN — Medication SCH ML: at 05:25

## 2018-08-23 RX ADMIN — POTASSIUM CHLORIDE SCH MEQ: 1500 TABLET, EXTENDED RELEASE ORAL at 21:51

## 2018-08-23 RX ADMIN — PANTOPRAZOLE SCH MG: 40 TABLET, DELAYED RELEASE ORAL at 08:38

## 2018-08-23 RX ADMIN — INSULIN GLARGINE SCH UNITS: 100 INJECTION, SOLUTION SUBCUTANEOUS at 22:00

## 2018-08-23 RX ADMIN — IPRATROPIUM BROMIDE AND ALBUTEROL SULFATE SCH ML: .5; 3 SOLUTION RESPIRATORY (INHALATION) at 19:39

## 2018-08-23 RX ADMIN — ONDANSETRON PRN MG: 2 INJECTION INTRAMUSCULAR; INTRAVENOUS at 19:01

## 2018-08-23 RX ADMIN — ALPRAZOLAM PRN MG: 0.25 TABLET ORAL at 19:01

## 2018-08-23 RX ADMIN — BUSPIRONE HYDROCHLORIDE SCH MG: 15 TABLET ORAL at 08:39

## 2018-08-23 RX ADMIN — VANCOMYCIN HYDROCHLORIDE SCH MG: 1 INJECTION, POWDER, LYOPHILIZED, FOR SOLUTION INTRAVENOUS at 16:46

## 2018-08-23 RX ADMIN — GABAPENTIN SCH MG: 100 CAPSULE ORAL at 21:52

## 2018-08-23 RX ADMIN — COLESTIPOL HYDROCHLORIDE SCH GM: 1 TABLET ORAL at 10:12

## 2018-08-23 RX ADMIN — INSULIN ASPART SCH UNITS: 100 INJECTION, SOLUTION INTRAVENOUS; SUBCUTANEOUS at 08:33

## 2018-08-23 RX ADMIN — INSULIN ASPART SCH UNITS: 100 INJECTION, SOLUTION INTRAVENOUS; SUBCUTANEOUS at 21:58

## 2018-08-23 RX ADMIN — LACTOBACILLUS TAB SCH TAB: TAB at 11:55

## 2018-08-23 RX ADMIN — FUROSEMIDE SCH MLS/MIN: 10 INJECTION, SOLUTION INTRAMUSCULAR; INTRAVENOUS at 10:52

## 2018-08-23 RX ADMIN — LISINOPRIL SCH MG: 2.5 TABLET ORAL at 08:40

## 2018-08-23 RX ADMIN — PRIMIDONE SCH MG: 50 TABLET ORAL at 22:02

## 2018-08-23 RX ADMIN — Medication SCH ML: at 23:49

## 2018-08-23 RX ADMIN — POTASSIUM CHLORIDE SCH MEQ: 1500 TABLET, EXTENDED RELEASE ORAL at 08:40

## 2018-08-23 RX ADMIN — INSULIN GLARGINE SCH UNITS: 100 INJECTION, SOLUTION SUBCUTANEOUS at 08:33

## 2018-08-23 RX ADMIN — BUSPIRONE HYDROCHLORIDE SCH MG: 15 TABLET ORAL at 21:51

## 2018-08-23 RX ADMIN — INSULIN ASPART SCH UNITS: 100 INJECTION, SOLUTION INTRAVENOUS; SUBCUTANEOUS at 16:41

## 2018-08-23 RX ADMIN — COLESTIPOL HYDROCHLORIDE SCH GM: 1 TABLET ORAL at 22:02

## 2018-08-23 RX ADMIN — VANCOMYCIN HYDROCHLORIDE SCH MG: 1 INJECTION, POWDER, LYOPHILIZED, FOR SOLUTION INTRAVENOUS at 05:24

## 2018-08-23 RX ADMIN — VANCOMYCIN HYDROCHLORIDE SCH MG: 1 INJECTION, POWDER, LYOPHILIZED, FOR SOLUTION INTRAVENOUS at 23:49

## 2018-08-23 RX ADMIN — FERROUS SULFATE TAB EC 325 MG (65 MG FE EQUIVALENT) SCH MG: 325 (65 FE) TABLET DELAYED RESPONSE at 08:39

## 2018-08-23 RX ADMIN — ISOSORBIDE MONONITRATE SCH MG: 30 TABLET, EXTENDED RELEASE ORAL at 08:38

## 2018-08-23 RX ADMIN — LACTOBACILLUS TAB SCH TAB: TAB at 16:46

## 2018-08-23 RX ADMIN — Medication SCH CAN: at 16:34

## 2018-08-23 RX ADMIN — Medication SCH CAN: at 07:30

## 2018-08-23 RX ADMIN — Medication SCH MG: at 08:39

## 2018-08-23 RX ADMIN — CLOPIDOGREL BISULFATE SCH MG: 75 TABLET, FILM COATED ORAL at 08:38

## 2018-08-23 RX ADMIN — IPRATROPIUM BROMIDE AND ALBUTEROL SULFATE SCH ML: .5; 3 SOLUTION RESPIRATORY (INHALATION) at 11:11

## 2018-08-23 RX ADMIN — Medication SCH TAB: at 08:38

## 2018-08-23 RX ADMIN — INSULIN ASPART SCH UNITS: 100 INJECTION, SOLUTION INTRAVENOUS; SUBCUTANEOUS at 11:58

## 2018-08-23 RX ADMIN — Medication SCH MG: at 08:40

## 2018-08-23 RX ADMIN — VANCOMYCIN HYDROCHLORIDE SCH MG: 1 INJECTION, POWDER, LYOPHILIZED, FOR SOLUTION INTRAVENOUS at 11:55

## 2018-08-23 RX ADMIN — COLESTIPOL HYDROCHLORIDE SCH GM: 1 TABLET ORAL at 16:33

## 2018-08-23 RX ADMIN — IPRATROPIUM BROMIDE AND ALBUTEROL SULFATE SCH ML: .5; 3 SOLUTION RESPIRATORY (INHALATION) at 06:49

## 2018-08-23 NOTE — PROGRESS NOTE
Subjective


Date of Service:


Aug 23, 2018.


Subjective


Pt evaluation today including:  conversation w/ patient, conversation w/ family 

( at bedside), physical exam, chart review, lab review, review of 

inpatient medication list


Pain:  none during the visit


PO Intake:  eating 100% meals


Voiding:  goodwin catheter in place


tele stable overnight


PVCs only


no dyspnea overnight although at 0700 she requested a neb for minimal shortness 

of breath 


no chest pain


occasional heartburn


no diarrhea; stool is now formed/soft 


no abd pain 


multiple questions about SNF / rehab





Review of Systems


Constitutional:  No fever, No chills


Respiratory:  No cough, No sputum, No wheezing


Cardiac:  No chest pain


Abdomen:  No pain





Objective


Vital Signs











  Date Time  Temp Pulse Resp B/P (MAP) Pulse Ox O2 Delivery O2 Flow Rate FiO2


 


8/23/18 16:00 36.6 92 20 119/58 (78) 95 Room Air  


 


8/23/18 12:03 36.6 80 16 106/61 (76) 96 Room Air  


 


8/23/18 11:14  92 18  96 Room Air  


 


8/23/18 08:05 36.6 95 16 128/59 (82) 95 Room Air  


 


8/23/18 08:00      Room Air 2.0 


 


8/23/18 06:49  88 18  96 Room Air  


 


8/23/18 04:48 36.9 80 17 113/54 (73) 95 Room Air  


 


8/22/18 23:59      Room Air  


 


8/22/18 23:45 36.9 71 18 117/63 (81) 97 Room Air  


 


8/22/18 19:46 36.7 90 20 105/51 (69) 94 Room Air  


 


8/22/18 18:56  88 18  96 Room Air  











Physical Exam


General Appearance:  no apparent distress, + pertinent finding (looks older 

than stated age)


ENT:  pharynx normal


Neck:  no JVD


Respiratory/Chest:  no respiratory distress, no accessory muscle use, + rales (

scant - bases)


Cardiovascular:  regular rate, rhythm, no gallop, no murmur


Abdomen:  normal bowel sounds, non tender, soft, no organomegaly


Extremities:  no pedal edema


Neurologic/Psychiatric:  alert, oriented x 3, + pertinent finding (anxious)


Skin:  + pertinent finding (wound vac in place, right anterior thigh )





Laboratory Results





Last 24 Hours








Test


  8/22/18


20:15 8/23/18


06:08 8/23/18


07:27 8/23/18


11:13


 


Bedside Glucose 159 mg/dl   118 mg/dl  150 mg/dl 


 


White Blood Count  7.69 K/uL   


 


Red Blood Count  4.04 M/uL   


 


Hemoglobin  11.8 g/dL   


 


Hematocrit  36.9 %   


 


Mean Corpuscular Volume  91.3 fL   


 


Mean Corpuscular Hemoglobin  29.2 pg   


 


Mean Corpuscular Hemoglobin


Concent 


  32.0 g/dl 


  


  


 


 


RDW Standard Deviation  54.9 fL   


 


RDW Coefficient of Variation  16.9 %   


 


Platelet Count  228 K/uL   


 


Mean Platelet Volume  8.2 fL   


 


Sodium Level  139 mmol/L   


 


Potassium Level  3.8 mmol/L   


 


Chloride Level  103 mmol/L   


 


Carbon Dioxide Level  27 mmol/L   


 


Anion Gap  9.0 mmol/L   


 


Blood Urea Nitrogen  16 mg/dl   


 


Creatinine  1.19 mg/dl   


 


Est Creatinine Clear Calc


Drug Dose 


  50.9 ml/min 


  


  


 


 


Estimated GFR (


American) 


  57.5 


  


  


 


 


Estimated GFR (Non-


American 


  49.6 


  


  


 


 


BUN/Creatinine Ratio  13.8   


 


Random Glucose  94 mg/dl   


 


Calcium Level  8.8 mg/dl   


 


Test


  8/23/18


16:30 


  


  


 


 


Bedside Glucose 131 mg/dl    











Assessment and Plan


61yo female with history of CAD s/p CABG in 2000, HTN, T2DM, morbid obesity, 

long-standing asthma, prior tobacco dependence (50 pack years), and recent month

-long hospitalization at [facility] in [place] for Rima'

s gangrene of the right thigh/groin who presented as a transfer from [facility] due to concerns for ACS and acute CHF.  








1.  positive troponin - likely from myocardial demand ischemia rather than ACS.

  Demand ischemia from acute CHF. 


s/p cath this admission - findings were similar to previous heart cath.  

Medical management recommended; no new stents placed.


Will continue her chronic CAD meds including BB, asa, plavix, statin, etc. 





2.  acute systolic CHF - approaching euvolemia.  1 more dose of IV lasix today; 

then probably po lasix tomorrow 40mg daily. 





Continue to Fluid restrict to 1500cc/day.  


Try to improve blood albumin level w/ supplements.  





Continue BB; added low-dose ACE.


We are awaiting records from [facility] to see what prior echo showed. 


Daily BMP while here. 


Suspect systolic CHF is due to ischemic cardiomyopathy from prior MI event.





3.  anxiety - improving; cont buspar 15mg BID and xanax 0.25mg prn. 





4.  recent Rima's gangrene of right groin/thigh - appreciate Wound Care 

Team consultation.  s/p woundvac placement today.  Added MVI/zinc/Vit C to help 

promote wound healing.  Added boost glucose control as well. 





5.  T2DM - controlled with adjustments in lantus + novolog.  





6.  h/o asthma - suspect she likely has COPD due to long-standing tobacco 

dependence - not in exacerbation at this time. 





7.  morbid obesity - BMI 40.





8.  HTN - controlled; continue home medications.  





9.  CAD - continue BB, statin, asa, plavix.  See discussion above. 





10.  hyperlipidemia - statin. 





11.  DVT proph - lovenox once daily. 





12.  hypoalbuminemia / protein calorie malnutrition - MVI, vit C, zinc, and 

boost glucose control.  I encouraged her today/yesterday to take the boost due 

to the low albumin levels. 





13.  anemia - likely due to chronic disease - b12, folate wnl.  Iron studies 

most c/w ACD but could have low grade Fe def.  


s/p 2 units PRBCs with stable H/H since.  


Cont Fe supplementation. 





14.  FEN - AHA/DM diet.


Fluid restrict 1500cc/day. 


BMP am. 





15.  c diff colitis - cont vancomycin; stop date is 8/24/18.  Cont lactinex 

TID.  Colestipol 1gm TID. 


Resolved.  





16.  h/o tobacco dependence - quit 1 month ago. 





17.  PVCs - change beta blocker to toprol xl 50mg hs.  Titrate if possible.  





18.  GERD - cont PPI. 








PT, OT daly appreciated


 updated once again at bedside


dispo - SNF for rehab; hopefully can d/c tomorrow to SNF


Continued [facility] stay due to:  multiple IV medications needed


Discharge planning:  skilled nursing facility

## 2018-08-23 NOTE — CARDIOLOGY FOLLOW-UP
Subjective


Subjective


Date of Service:


Aug 23, 2018.


Pt evaluation today including:  conversation w/ patient, physical exam, chart 

review, lab review, review of studies, review of inpatient medication list


Additional Details:


No chest pain. 


Breathing "a little tight this morning" but improved currently and no 

additional events overnight. 


No other new concerns - ready to go home. 





Tele reviewed - no events





Review of Systems


Constitutional:  No fever, No chills


Respiratory:  No cough, No sputum, No wheezing


Cardiac:  No chest pain, No edema


Abdomen:  No pain, No nausea, No vomiting, No diarrhea





Objective


Vital Signs





Last Vital Signs Documentation








  Date Time  Temp Pulse Resp B/P (MAP) Pulse Ox O2 Delivery O2 Flow Rate FiO2


 


8/23/18 08:05 36.6 95 16 128/59 (82) 95 Room Air  


 


8/22/18 12:00       2.0 


 


8/18/18 19:55        100











Physical Exam:


General Appearance:  no apparent distress, + pertinent finding (very anxious)


ENT:  pharynx normal


Neck:  no JVD


Respiratory/Chest:  no respiratory distress, no accessory muscle use


Cardiovascular:  regular rate, rhythm, no gallop, no murmur


Abdomen:  normal bowel sounds, non tender, soft, no organomegaly


Extremities:  no pedal edema, + pertinent finding (left radial artery access 

site - pulse intact, no hematoma/ecchymosis.   Right thigh wound dressed - no 

surrounding erythema/drainage)


Neurologic/Psychiatric:  alert, oriented x 3, + pertinent finding (anxious)


Skin:  + pertinent finding (pallor resolved )





Assessment and Plan


1. Multi-vessel CAD/mildly elevated troponin


2. Acute systolic heart failure/ICM


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


4. DM2


5. Anemia





Well perfused, minimal residual congestion on exam.  


Down more than 6L from admission.  Renal function stable. 





From a cardiac standpoint OK for discharge today. 





-- Would d/c on following cardiac regimen:


   - Toprol XL 100mg 


   - Lisinopril 5mg 


   - Atorvastatin 40mg 


   - Clopidogrel 75mg 


   - Aspirin 81mg 


   - Furosemide 40mg 





Follow-up with me in 2-3 weeks.  Repeat echo 3 months as an outpatient.


Continued [facility] stay due to:  multiple IV medications needed


Discharge planning:  skilled nursing facility


Medications:





Current Inpatient Medications








 Medications


  (Trade)  Dose


 Ordered  Sig/[facility]


 Route  Start Time


 Stop Time Status Last Admin


Dose Admin


 


 Enoxaparin Sodium


  (Lovenox Inj)  40 mg  HS


 SC  8/17/18 22:00


 9/16/18 21:59  8/22/18 20:18


40 MG


 


 Acetaminophen


  (Tylenol Tab)  650 mg  Q4H  PRN


 PO  8/17/18 17:45


 9/16/18 17:44   


 


 


 Magnesium


 Hydroxide


  (Milk Of


 Magnesia Susp)  30 ml  Q12H  PRN


 PO  8/17/18 17:45


 9/16/18 17:44   


 


 


 Ondansetron HCl


  (Zofran Inj)  4 mg  Q6H  PRN


 IV  8/17/18 17:45


 9/16/18 17:44  8/19/18 14:13


4 MG


 


 Nitroglycerin


  (Nitrostat Tab)  0.4 mg  UD  PRN


 SL  8/17/18 17:45


 9/16/18 17:44   


 


 


 Aspirin


  (Ecotrin Tab)  81 mg  QAM


 PO  8/18/18 09:00


 9/17/18 08:59  8/22/18 08:56


81 MG


 


 Atorvastatin


 Calcium


  (Lipitor Tab)  40 mg  HS


 PO  8/17/18 21:00


 9/16/18 20:59  8/22/18 20:17


40 MG


 


 Clopidogrel


 Bisulfate


  (plAVix TAB)  75 mg  DAILY


 PO  8/18/18 09:00


 9/17/18 08:59  8/22/18 08:57


75 MG


 


 Metoprolol


 Tartrate


  (Lopressor Tab)  50 mg  BID


 PO  8/17/18 21:00


 9/16/18 20:59  8/22/18 20:17


50 MG


 


 Pantoprazole


 Sodium


  (Protonix Tab)  40 mg  QAM


 PO  8/18/18 09:00


 9/17/18 08:59  8/22/18 08:57


40 MG


 


 Vancomycin HCl


  (Vancomycin Oral


 Soln)  125 mg  Q6


 PO  8/17/18 18:30


 8/27/18 18:29  8/23/18 05:24


125 MG


 


 Lactobacillus


 Acidophilus


  (Floranex Tab)  4 tab  TIDM


 PO  8/18/18 07:30


 9/17/18 07:29  8/22/18 16:42


4 TAB


 


 Insulin Aspart


  (novoLOG ASPART)  **SLIDING


 SCALE**


 **G...  ACHS


 SC  8/17/18 21:00


 9/16/18 20:59  8/22/18 16:44


4 UNITS


 


 Primidone


  (Mysoline Tab)  50 mg  HS


 PO  8/17/18 21:00


 9/16/18 20:59  8/22/18 20:17


50 MG


 


 Gabapentin


  (Neurontin Cap)  200 mg  HS


 PO  8/17/18 21:00


 9/16/18 20:59  8/22/18 20:18


200 MG


 


 Tramadol HCl


  (Ultram Tab)  50 mg  Q6H  PRN


 PO  8/17/18 18:00


 9/16/18 17:59  8/20/18 21:10


50 MG


 


 Glucose


  (Glucose 40% Gel)  15-30


 GRAMS 15


 GRAMS...  UD  PRN


 PO  8/17/18 18:30


 9/16/18 18:29   


 


 


 Glucose


  (Glucose Chew


 Tab)  4-8


 Tablets 4


 Tabl...  UD  PRN


 PO  8/17/18 18:30


 9/16/18 18:29   


 


 


 Dextrose


  (Dextrose 50%


 50ML Syringe)  25-50ML


 25ML FOR


 ...  UD  PRN


 IV  8/17/18 18:30


 9/16/18 18:29   


 


 


 Glucagon


  (Glucagon Inj)  1 mg  UD  PRN


 IM  8/17/18 18:30


 9/16/18 18:29   


 


 


 Carbohydrates


  (Carbohydrates


 For Hypoglycemia)  15-30 GRAMS


 15 grams if


 BSG 54-69...  UD  PRN


 PO  8/17/18 18:30


 9/16/18 18:29   


 


 


 Raspberry


  (Raspberry Syrup


 5ml Cup)  5 ml  Q6


 PO  8/17/18 18:30


 8/31/18 18:29  8/23/18 05:25


5 ML


 


 Albuterol/


 Ipratropium


  (Duoneb)  3 ml  QIDR


 INH  8/18/18 08:00


 9/17/18 07:59  8/23/18 06:49


3 ML


 


 Multivitamins/


 Minerals


  (Multivitamin W/


 Minerals Tab)  1 tab  QAM


 PO  8/19/18 09:00


 9/18/18 08:59  8/22/18 08:57


1 TAB


 


 Zinc Sulfate


  (Zinc Sulfate


 Cap)  220 mg  QAM


 PO  8/19/18 09:00


 9/18/18 08:59  8/22/18 08:56


220 MG


 


 Ascorbic Acid


  (Vitamin C Tab)  500 mg  QAM


 PO  8/19/18 09:00


 9/18/18 08:59  8/22/18 08:56


500 MG


 


 Ferrous Sulfate


  (Feosol Tab)  325 mg  BID


 PO  8/18/18 09:45


 9/17/18 09:44  8/22/18 21:14


325 MG


 


 Potassium Chloride


  (Klor-Con Tab)  20 meq  BID


 PO  8/18/18 09:45


 9/17/18 09:44  8/22/18 20:17


20 MEQ


 


 Enteral


 Nutritional


 Formula


  (Boost Glucose


 Control)  1 can  BIDM


 PO  8/18/18 16:45


 9/17/18 16:44  8/22/18 16:42


1 CAN


 


 Furosemide 40 mg/


 Syringe  4 ml @ 4


 mls/min  QAM


 IV  8/19/18 09:00


 9/18/18 08:59 Future hold 8/20/18 09:27


4 MLS/MIN


 


 Insulin Glargine


  (Lantus Solostar


 Pen)  20 units  BID


 SC  8/19/18 09:00


 9/16/18 20:59  8/22/18 20:20


20 UNITS


 


 Colestipol HCl


  (Colestid Tab)  1 gm  TID@1000,1500,2200


 PO  8/19/18 15:00


 9/18/18 14:59  8/22/18 20:17


1 GM


 


 Lisinopril


  (Zestril Tab)  2.5 mg  QAM


 PO  8/20/18 09:00


 9/18/18 08:59  8/22/18 08:56


2.5 MG


 


 Buspirone HCl


  (BusPAR TAB)  15 mg  BID


 PO  8/22/18 09:00


 9/20/18 20:59  8/22/18 20:18


15 MG


 


 Alprazolam


  (Xanax Tab)  0.25 mg  Q6H  PRN


 PO  8/22/18 08:00


 9/21/18 07:59  8/22/18 08:10


0.25 MG


 


 Isosorbide


 Mononitrate


  (Imdur Ext Rel


 Tab)  30 mg  QAM


 PO  8/22/18 09:00


 9/21/18 08:59  8/22/18 08:55


30 MG








Lab Results:


8/23/18 06:08








8/23/18 06:08

















Test


  8/23/18


06:08 8/23/18


07:27


 


Red Blood Count


  4.04 M/uL


(4.2-5.4) 


 


 


Mean Corpuscular Volume


  91.3 fL


() 


 


 


Mean Corpuscular Hemoglobin


  29.2 pg


(25-34) 


 


 


Mean Corpuscular Hemoglobin


Concent 32.0 g/dl


(32-36) 


 


 


RDW Standard Deviation


  54.9 fL


(36.4-46.3) 


 


 


RDW Coefficient of Variation


  16.9 %


(11.5-14.5) 


 


 


Mean Platelet Volume


  8.2 fL


(7.4-10.4) 


 


 


Anion Gap


  9.0 mmol/L


(3-11) 


 


 


Est Creatinine Clear Calc


Drug Dose 50.9 ml/min 


  


 


 


Estimated GFR (


American) 57.5 


  


 


 


Estimated GFR (Non-


American 49.6 


  


 


 


BUN/Creatinine Ratio 13.8 (10-20)  


 


Calcium Level


  8.8 mg/dl


(8.5-10.1) 


 


 


Bedside Glucose


  


  118 mg/dl


(70-90)

## 2018-08-23 NOTE — PROGRESS NOTE
Subjective


Date of Service:


Aug 22, 2018.


Subjective


Pt evaluation today including:  conversation w/ patient, conversation w/ family 

( at bedside), physical exam, chart review, lab review, conversation w/ 

consultant (cardiology), review of inpatient medication list


Pain:  none reported


PO Intake:  eating decently


Voiding:  no voiding problems


overnight patient had dyspnea, some orthopnea, re-application of NC O2, 

fluttering/palpitations, and burning sensation in chest


she had received a prn neb without any improvement in symptoms 


continues to remain severely anxious


she received her 2 units of PRBCs with lasix in between units 


the nocturnist was called following the completion of her transfusion and at 

that time an additional dose of IV lasix was given with copious diuresis 





during my AM rounds she was quite anxious, stating "why am I so short of breath?

"



only 1 stool in the last 2-3 days





Review of Systems


Constitutional:  No fever, No chills


Respiratory:  + shortness of breath, No cough, No sputum, No wheezing


Cardiac:  + orthopnea, + PND, No chest pain, No edema


Abdomen:  No pain, No nausea, No vomiting, No diarrhea





Objective


Vital Signs











  Date Time  Temp Pulse Resp B/P (MAP) Pulse Ox O2 Delivery O2 Flow Rate FiO2


 


8/22/18 18:56  88 18  96 Room Air  


 


8/22/18 16:00      Room Air  


 


8/22/18 15:42 36.7 88 20 99/52 (68) 96 Room Air  


 


8/22/18 15:07  92 18  95 Room Air  


 


8/22/18 12:00 37.1 77 16 125/74 (91) 100 Nasal Cannula 2.0 


 


8/22/18 11:12  85 20  100 Nasal Cannula 2.0 


 


8/22/18 08:00      Nasal Cannula 2.0 


 


8/22/18 07:59 37.1       


 


8/22/18 07:51  88 20 127/71 (89) 97 Nasal Cannula 2.0 


 


8/22/18 06:59  87 20  99 Nasal Cannula 3.0 


 


8/22/18 05:53 36.9 82 19 120/73 (89) 97 Room Air  


 


8/22/18 00:07  89 20  99 Nasal Cannula 4.0 


 


8/22/18 00:00      Room Air  


 


8/21/18 23:43 36.5 84 19 132/75 (94) 94 Room Air  


 


8/21/18 23:28 37.2 78 20 135/72 96   


 


8/21/18 22:28 36.9 76 18 108/73 97   


 


8/21/18 21:58 37.1 80 18 108/75 98   


 


8/21/18 21:28 37.1 87 18 128/76 98   


 


8/21/18 21:13 37.1 86 18 116/59 98   


 


8/21/18 19:12 37.0 95 18 130/59 96   











Physical Exam


General Appearance:  no apparent distress, + pertinent finding (very anxious)


ENT:  pharynx normal


Neck:  no JVD


Respiratory/Chest:  no respiratory distress, no accessory muscle use, + rales (

mild - bases)


Cardiovascular:  regular rate, rhythm, no gallop, no murmur


Abdomen:  normal bowel sounds, non tender, soft, no organomegaly


Extremities:  no pedal edema


Neurologic/Psychiatric:  alert, oriented x 3, + pertinent finding (anxious)


Skin:  + pertinent finding (pallor resolved )





Laboratory Results





Last 24 Hours








Test


  8/21/18


20:58 8/22/18


06:03 8/22/18


07:18 8/22/18


11:18


 


Bedside Glucose 225 mg/dl   126 mg/dl  131 mg/dl 


 


White Blood Count  6.82 K/uL   


 


Red Blood Count  3.84 M/uL   


 


Hemoglobin  11.4 g/dL   


 


Hematocrit  34.8 %   


 


Mean Corpuscular Volume  90.6 fL   


 


Mean Corpuscular Hemoglobin  29.7 pg   


 


Mean Corpuscular Hemoglobin


Concent 


  32.8 g/dl 


  


  


 


 


RDW Standard Deviation  52.6 fL   


 


RDW Coefficient of Variation  16.5 %   


 


Platelet Count  262 K/uL   


 


Mean Platelet Volume  8.5 fL   


 


Sodium Level  140 mmol/L   


 


Potassium Level  3.9 mmol/L   


 


Chloride Level  104 mmol/L   


 


Carbon Dioxide Level  25 mmol/L   


 


Anion Gap  11.0 mmol/L   


 


Blood Urea Nitrogen  16 mg/dl   


 


Creatinine  1.13 mg/dl   


 


Est Creatinine Clear Calc


Drug Dose 


  54.1 ml/min 


  


  


 


 


Estimated GFR (


American) 


  61.2 


  


  


 


 


Estimated GFR (Non-


American 


  52.8 


  


  


 


 


BUN/Creatinine Ratio  13.7   


 


Random Glucose  111 mg/dl   


 


Calcium Level  8.8 mg/dl   


 


Magnesium Level  2.3 mg/dl   


 


Troponin I  0.043 ng/ml   


 


Test


  8/22/18


16:16 


  


  


 


 


Bedside Glucose 139 mg/dl    











Assessment and Plan


61yo female with history of CAD s/p CABG in 2000, HTN, T2DM, morbid obesity, 

long-standing asthma, prior tobacco dependence (50 pack years), and recent month

-long hospitalization at [facility] in [place] for Rima'

s gangrene of the right thigh/groin who presented as a transfer from [facility] due to concerns for ACS and acute CHF.  








1.  positive troponin - likely from myocardial demand ischemia rather than ACS.

  Demand ischemia from acute CHF. 


s/p cath this admission - findings were similar to previous heart cath. 


Will continue her chronic CAD meds including BB, asa, plavix, statin, etc. 


Despite chest symptoms in the last 24 hours her troponin is negative.  





2.  acute systolic CHF - s/p 3 doses of IV lasix in the last 24 hours.  She 

still has edema on exam today. 


Plan another dose of IV lasix 40mg early this afternoon today.  





Continue to Fluid restrict to 1500cc/day.  


Try to improve blood albumin level w/ supplements.  





Continue BB; added low-dose ACE (lisinopril 2.5mg daily) but we may be limited 

with BP.


We are awaiting records from [facility] to see what prior echo showed. 


Daily BMP while here. 


Suspect systolic CHF is due to ischemic cardiomyopathy from prior MI event.





3.  anxiety - increase buspar to 15mg BID; add xanax 0.25mg prn. 





4.  recent Rima's gangrene of right groin/thigh - appreciate Wound Care 

Team consultation.  s/p woundvac placement today.  Added MVI/zinc/Vit C to help 

promote wound healing.  Added boost glucose control as well. 





5.  T2DM - improved/controlled with adjustments in lantus + novolog.  





6.  h/o asthma - suspect she likely has COPD due to long-standing tobacco 

dependence - not in exacerbation at this time. 





7.  morbid obesity - BMI 40.





8.  HTN - controlled; continue home medications.  Added low-dose ACE for 

systolic CHF.





9.  CAD - continue BB, statin, asa, plavix.  See discussion above. 





10.  hyperlipidemia - statin. 





11.  DVT proph - lovenox once daily. 





12.  hypoalbuminemia / protein calorie malnutrition - MVI, vit C, zinc, and 

boost glucose control. 





13.  anemia - likely due to chronic disease - b12, folate wnl.  Iron studies 

most c/w ACD but could have low grade Fe def.  Will supplement.  


s/p 2 units PRBCs with improved H/H today.  


CBC in am. 





14.  FEN - AHA/DM diet.


Fluid restrict 1500cc/day. 


BMP am. 





15.  c diff colitis - cont vancomycin; records from [facility] state her 

stop date is 8/24/18.  Cont lactinex TID.  Colestipol 1gm TID. 


Resolved.  





16.  h/o tobacco dependence - quit 1 month ago. 





17.  PVCs - consider titration of beta blocker. 





18.  ?GERD - change zantac to 150 BID by mouth. 








PT, OT evals appreciated


 updated once again at bedside


oob to chair BID 


dispo - SNF for rehab


Continued [facility] stay due to:  voiding difficulties, ambulation difficulties, 

multiple IV medications needed, other (CHF)


Discharge planning:  skilled nursing facility

## 2018-08-24 VITALS
HEART RATE: 91 BPM | OXYGEN SATURATION: 97 % | DIASTOLIC BLOOD PRESSURE: 59 MMHG | TEMPERATURE: 99.14 F | SYSTOLIC BLOOD PRESSURE: 97 MMHG

## 2018-08-24 VITALS
HEART RATE: 70 BPM | DIASTOLIC BLOOD PRESSURE: 54 MMHG | OXYGEN SATURATION: 96 % | SYSTOLIC BLOOD PRESSURE: 107 MMHG | TEMPERATURE: 98.96 F

## 2018-08-24 VITALS
OXYGEN SATURATION: 97 % | TEMPERATURE: 99.14 F | DIASTOLIC BLOOD PRESSURE: 39 MMHG | HEART RATE: 91 BPM | SYSTOLIC BLOOD PRESSURE: 97 MMHG

## 2018-08-24 VITALS — OXYGEN SATURATION: 96 % | HEART RATE: 74 BPM

## 2018-08-24 VITALS
SYSTOLIC BLOOD PRESSURE: 110 MMHG | TEMPERATURE: 98.24 F | OXYGEN SATURATION: 95 % | HEART RATE: 72 BPM | DIASTOLIC BLOOD PRESSURE: 52 MMHG

## 2018-08-24 VITALS — OXYGEN SATURATION: 97 % | HEART RATE: 77 BPM

## 2018-08-24 LAB
BUN SERPL-MCNC: 21 MG/DL (ref 7–18)
CALCIUM SERPL-MCNC: 8.9 MG/DL (ref 8.5–10.1)
CO2 SERPL-SCNC: 26 MMOL/L (ref 21–32)
CREAT SERPL-MCNC: 1.32 MG/DL (ref 0.6–1.2)
GLUCOSE SERPL-MCNC: 98 MG/DL (ref 70–99)
POTASSIUM SERPL-SCNC: 4 MMOL/L (ref 3.5–5.1)
SODIUM SERPL-SCNC: 139 MMOL/L (ref 136–145)

## 2018-08-24 RX ADMIN — Medication SCH TAB: at 08:49

## 2018-08-24 RX ADMIN — FERROUS SULFATE TAB EC 325 MG (65 MG FE EQUIVALENT) SCH MG: 325 (65 FE) TABLET DELAYED RESPONSE at 08:51

## 2018-08-24 RX ADMIN — Medication SCH CAN: at 08:49

## 2018-08-24 RX ADMIN — INSULIN ASPART SCH UNITS: 100 INJECTION, SOLUTION INTRAVENOUS; SUBCUTANEOUS at 08:57

## 2018-08-24 RX ADMIN — ALPRAZOLAM PRN MG: 0.25 TABLET ORAL at 12:39

## 2018-08-24 RX ADMIN — LACTOBACILLUS TAB SCH TAB: TAB at 12:32

## 2018-08-24 RX ADMIN — COLESTIPOL HYDROCHLORIDE SCH GM: 1 TABLET ORAL at 10:03

## 2018-08-24 RX ADMIN — PANTOPRAZOLE SCH MG: 40 TABLET, DELAYED RELEASE ORAL at 08:51

## 2018-08-24 RX ADMIN — OXYCODONE HYDROCHLORIDE AND ACETAMINOPHEN SCH MG: 500 TABLET ORAL at 08:50

## 2018-08-24 RX ADMIN — INSULIN GLARGINE SCH UNITS: 100 INJECTION, SOLUTION SUBCUTANEOUS at 08:58

## 2018-08-24 RX ADMIN — POTASSIUM CHLORIDE SCH MEQ: 1500 TABLET, EXTENDED RELEASE ORAL at 08:50

## 2018-08-24 RX ADMIN — CLOPIDOGREL BISULFATE SCH MG: 75 TABLET, FILM COATED ORAL at 08:49

## 2018-08-24 RX ADMIN — VANCOMYCIN HYDROCHLORIDE SCH MG: 1 INJECTION, POWDER, LYOPHILIZED, FOR SOLUTION INTRAVENOUS at 12:33

## 2018-08-24 RX ADMIN — BUSPIRONE HYDROCHLORIDE SCH MG: 15 TABLET ORAL at 08:51

## 2018-08-24 RX ADMIN — IPRATROPIUM BROMIDE AND ALBUTEROL SULFATE SCH ML: .5; 3 SOLUTION RESPIRATORY (INHALATION) at 11:04

## 2018-08-24 RX ADMIN — Medication SCH MG: at 08:51

## 2018-08-24 RX ADMIN — INSULIN ASPART SCH UNITS: 100 INJECTION, SOLUTION INTRAVENOUS; SUBCUTANEOUS at 12:36

## 2018-08-24 RX ADMIN — VANCOMYCIN HYDROCHLORIDE SCH MG: 1 INJECTION, POWDER, LYOPHILIZED, FOR SOLUTION INTRAVENOUS at 05:45

## 2018-08-24 RX ADMIN — LACTOBACILLUS TAB SCH TAB: TAB at 08:51

## 2018-08-24 RX ADMIN — Medication SCH ML: at 05:45

## 2018-08-24 RX ADMIN — IPRATROPIUM BROMIDE AND ALBUTEROL SULFATE SCH ML: .5; 3 SOLUTION RESPIRATORY (INHALATION) at 07:00

## 2018-08-24 RX ADMIN — Medication SCH ML: at 12:33

## 2018-08-24 RX ADMIN — ISOSORBIDE MONONITRATE SCH MG: 30 TABLET, EXTENDED RELEASE ORAL at 08:49

## 2018-08-24 RX ADMIN — LISINOPRIL SCH MG: 2.5 TABLET ORAL at 08:51

## 2018-08-24 NOTE — DISCHARGE SUMMARY
Discharge Summary


Date of Service


Aug 24, 2018.





Discharge Summary


Admission Date:


Aug 17, 2018 at 15:36


Discharge Date:  Aug 24, 2018


Discharge Disposition:  Skilled nursing facility ([place], [place])


Principal Diagnosis:  acute (on likely chronic) systolic/diastolic CHF


Problems/Secondary Diagnoses:


1.  CAD, s/p NSTEMI in the past, s/p CABG in 2000, s/p multiple stents in the 

past as well 


2.  T2DM


3.  HTN


4.  tobacco dependence - quit 7/2018


5.  hyperlipidemia


6.  morbid obesity - BMI 38.6


7.  asthma since childhood (question of COPD with prior tobacco dependence)


8.  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 

stay at [facility]; now with large right thigh wound


9.  c diff colitis - 8/2018 - dx at [facility]


10.  anemia of chronic disease 


11.  positive troponin - likely myocardial demand ischemia in setting of acute 

CHF


12.  severe anxiety 


13.  moderate protein calorie malnutrition / hypoalbuminemia


14.  GERD


15.  frequent PVCs


16.  UTI - culture pending at time of discharge


17.  CKD stage 3


Immunizations:  


   Have You Had Influenza Vaccine:  No


   History of Pneumococcal:  No


Procedures:


1.  RLE venous duplex study - negative for DVT. 








2.  application of woundvac, right thigh.








3.  echocardiogram - 


* -- Conclusions --


* 1. Normal left ventricular size. Moderately reduced systolic function. 

Estimated EF 35%.  Global hypokinesis.  No left ventricular hypertrophy.  Type 

2 diastolic dysfunction.


* 2. Grossly normal right ventricular size with mildly reduced systolic 

function.


* 3. There is mild mitral regurgitation.


* 4. Normal estimated right ventricular systolic pressure; 33mmHg.


* 5. Technically difficult study.  IV echo contrast may enhance study and allow 

for better interpretation of wall motion and LV systolic function.


* 6. No prior study available for comparison.








4.  Left Heart Catheterization - Ever Roman MD


Findings:


LM - Patent distal stent


LAD - 90% ostial stenosis, 70*80% diffuse proximal in-stent restenosis, mid LAD 

with competitive flow form LIMA; moderate sized 1st diagonal with mild disease.


Circumflex - Patent proximal stent, distal segment and L-PDA with luminal 

irregularities.  OM1 with patent stents and mild distal disease.


Ramus - completely occluded at distal edge of previously placed stent, distal 

vessel fills retrograde via left to left collaterals


RCA - Small non-dominant with patient prior stent in moderate diffuse in-stent 

restenosis





Patent LIMA-LAD.  Distal LAD small without significant disease.





LVEDP - 19





Summary:


1. Severe multivessel native coronary artery disease (unchanged from prior 

catheterization after POBA 1/2017).


 - Patent LM, proximal circumflex stent


 - Severe proximal LAD in-stent restenosis


 - Occluded ramus stent, fills distally via left to left collaterals


 - Patent OM1 with mild distal disease





2. Patent LIMA - LAD.  Remaining Vein grafts known to be occluded





3. Elevated intracardiac filling pressure








5.  PRBCs x 2 units


Consultations:


cardiology - Ever Roman MD


surgery - Tanner Khan MD


wound care nurse


PT, OT





Medication Reconciliation


New Medications:  


Buspirone Hcl (Buspar) 15 Mg Tab


7.5 TAB PO BID for 30 Days, #60 TAB 0 Refills





Cephalexin Monohydrate (Keflex) 500 Mg Cap


500 MG PO BID, #13 CAP 0 Refills





Lactobacillus (Lactinex)  Chw


4 TAB PO TID for 10 Days, #120 TABS 0 Refills





Acetaminophen (Mapap) 325 Mg Tab


650 MG PO Q4H PRN for Pain or Fever, #30 TAB 0 Refills





Alprazolam (Alprazolam) 0.25 Mg Tab


0.25 MG PO Q6H PRN for anxiety, #30 TAB 0 Refills





Ascorbic Acid (Vitamin C) 500 Mg Tab


500 MG PO QAM for 10 Days, #10 TAB 0 Refills





Colestipol HCl (Colestid) 1 Gm Tab


1 GM PO BID@1000,2200 for 7 Days, #14 TAB 0 Refills





Ferrous Sulfate (Ferrous Sulfate) 325 Mg Tab


325 MG PO BID, #60 TAB 1 Refill





Furosemide (Furosemide) 40 Mg Tab


40 MG PO QAM, #30 TAB 5 Refills





Gabapentin (Gabapentin) 100 Mg Cap


200 MG PO HS, #60 CAP 5 Refills





Insulin Aspart (Novolog Flexpen) 100 Units/Ml Inj


0 UNITS SC AC, #1 PEN 1 Refill


sliding scale: for meal-time use only --


 for fingerstick blood sugar of 150-200 give 4 units;


 for fingerstick blood sugar of 201-250 give 6 units;


 for fingerstick blood sugar of 251-300 give 8 units;


 for fingerstick blood sugar of 301-350 give 10 units;


 for fingerstick blood sugar of >350 give 12 units and call Medical


 Director.


Insulin Glargine (Lantus Solostar) 100 Unit/Ml Inj


18 UNITS SC BID, #1 PEN 2 Refills





Ipratropium-Albuterol (Duoneb) 3 Ml Nebu


3 ML INH Q6H PRN for cough/wheeze, #1 BOX 1 Refill





Lisinopril (Lisinopril) 2.5 Mg Tab


2.5 MG PO QAM, #30 TAB 5 Refills





Multivitamins/Minerals (Certavite/Antioxidants) 1 Tab Tab


1 TAB PO QAM, #90 TAB 3 Refills





Nutritional Supplements (Boost) 1 Liq Liq


1 CAN PO BIDM, #60 CAN 2 Refills





Potassium Chloride (Klor-Con M20) 20 Meq Tabcr


20 MEQ PO DAILY, #30 TAB 5 Refills





Primidone (Primidone) 50 Mg Tab


50 MG PO HS, #30 TAB 5 Refills





Tramadol HCl (Tramadol HCl) 50 Mg Tab


50 MG PO Q6H PRN for Pain, #30 TAB 0 Refills





Vancomycin HCl (Vancomycin HCl + Syrspend) 50 Mg/Ml Beatris


125 MG PO Q6 for 1 Day, #4 DOSE 0 Refills





Zinc Sulfate (Zinc Sulfate) 220 Mg Cap


220 MG PO QAM for 10 Days, #10 CAP 0 Refills





 


Changed Medications:  


Metoprolol Succinate (Toprol Xl) 50 Mg Tabcr


50 MG PO DAILY, #30 TAB 5 Refills (Changed from: Metoprolol Tartrate (Lopressor

) 50 Mg Tab 1 Tab PO BID 30 Days #60 TAB)





 


Continued Medications:  


Aspirin (Aspirin EC Low Dose) 81 Mg Ectab


81 MG PO QAM for 30 Days, #30 TAB 9 Refills





Atorvastatin (Lipitor) 40 Mg Tab


1 TAB PO HS for 90 Days, #90 TAB 3 Refills





Clopidogrel Bisulfate (Plavix) 75 Mg Tab


75 MG PO DAILY, #30 TAB


Take as directed.


Nitroglycerin (Nitrostat) 0.4 Mg/1 Tab Subl


0.4 MG SL UD PRN for Chest Pain for 30 Days, #30 TAB


Take 1 tablet every 5 minutes for chest pain, up to 3 times. If pain


 not resolved call 911 or go to ER.


Pantoprazole (Pantoprazole Sodium) 40 Mg Tab


40 MG PO QAM for 30 Days, #30 TAB 9 Refills





 


Discontinued Medications:  


Cyclobenzaprine Hcl (Flexeril) 10 Mg Tab


1 TAB PO TID for 10 Days, #30 TAB





Insulin Aspart 70/30 (Novolog Mix 70/30)  Susp


20 UNIT SC QAM, BTL





Insulin Aspart 70/30 (Novolog Mix 70/30)  Susp


12 UNIT SC QPM, BTL





Triamterene/Hctz (Dyazide 37.5MG/25MG)  Cap


1 CAP PO DAILY PRN for leg swelling for 30 Days, #30 CAP 3 Refills











Discharge Exam


Physical Exam:  


   General Appearance:  no apparent distress, + obese, + pertinent finding (

looks older than stated age)


   ENT:  pharynx normal


   Neck:  no JVD


   Respiratory/Chest:  no respiratory distress, no accessory muscle use, + rales

 (minimal, bases, R > L)


   Cardiovascular:  regular rate, rhythm, no gallop, no murmur, normal 

peripheral pulses


   Abdomen / GI:  normal bowel sounds, non tender, soft, no organomegaly


   Extremities:  no pedal edema


   Neurologic/Psychiatric:  alert, oriented x 3, + pertinent finding (mildly 

anxious)


   Skin:  + pertinent finding (right groin/upper thigh - large, irregularly 

shaped wound with very clean, pink bed; no drainage; no odor; good granulation; 

at least 10cm or more in diameter; near the inner thigh and the right labia 

majora are several sutures intact; no erythema or signs of superimposed 

infection)





Hospital Course


HISTORY OF PRESENT ILLNESS:


61yo female with history of CAD s/p CABG in 2000, HTN, T2DM, morbid obesity, 

and long-standing asthma who presents as a transfer from [facility] for several concerns including chest tightness, elevated 

troponin, ongoing shortness of breath, and concerns of LE edema.  The patient 

was just released from [facility] in [place] this past Monday after a 

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  She required 

multiple surgeries for debridement, antibiotics, and ultimately placement of a 

wound vac.  During the latter portion of her stay she states she had been short 

of breath and this was treated with bronchodilators.  Upon discharge she states 

she was still short of breath.  On Tuesday AM of this week a home health nurse 

placed a wound vac on the right inner thigh wound.  Over the next 48 hours 

there were issues with getting a good seal on the right leg and it was leaking 

by report.  





Wednesday pm the patient recalls having shortness of breath at night-time and 

then had recurrent dyspnea along with chest tightness on Thursday.  She also 

reports having had severe bilateral LE edema ("my legs feel like lead") for 

some time.  She also complains of edema in her abdomen, arms, and right side of 

her face.  She went to [facility] Thursday afternoon for the above 

complaints.  She was admitted through the [place] [facility] to their telemetry 

unit.  The patient states the wound vac was removed due to persistent leaking.  





Records from [place] were reviewed and the following were found - 


1.  CTA chest was negative for PE but showed small b/l pleural effusions; no 

pneumonia seen


2.  b/l LE dopplers negative for DVT although the right leg was a limited study 

due to her wound


3.  EKG with NSR and no ST changes (my reading)


4.  serial troponins - 0.057, 0.052, 0.060, 0.053 





During my initial assessment the patient complained of fatigue but confirmed 

her dyspnea had improved.


Records suggest she received at least 2 doses of IV lasix while at [facility].  


She denied any chest pain. 





Lastly, the patient mentions she was diagnosed with c. diff colitis while at 

[facility].  








HOSPITAL COURSE: 


At time of transfer from [facility] the patient was in decompensated 

CHF clinically and radiographically. 


Repeat troponins at [facility] were similar in value in comparison to 

[facility] and felt to represent myocardial demand ischemia 


rather than a true ACS.  





Echocardiogram was obtained and unfortunately revealed an EF of 35% with grade 

2 diastolic dysfunction.  There was not an old echocardiogram from the [facility] system to compare with.  The patient believed she had had an 

echocardiogram from [facility] and this was requested via medical records but we did 

not receive a report.  The CHF was likely due to ischemic cardiomyopathy. 





None-the-less the patient was aggressively diuresed and she had about a 7kg 

weight loss while hospitalized. 


Weight on day of discharge was 89.7kg.  She will continue on 40mg of lasix 

daily along with fluid and salt restriction.  Daily weights will be needed at 

the nursing home. 





The patient was seen in consult by Dr. Ever Roman, her primary 

cardiologist, and he advised a repeat heart catheterization in light of her new-

onset CHF.  


Heart catheterization showed significant CAD (see report above) but no specific 

lesion needed intervention.  Medical management was advised.  


Thus, she will continue her chronic CAD meds including beta blocker, aspirin, 

plavix, statin, and ACE.   








Other issues addressed while hospitalized - 





1.  recent Rima's gangrene of right groin/thigh requiring hospitalization 

at [facility] - the patient was seen by the [facility] 

[facility] and surgery.  


Woundvac was successfully placed, and the vac is on continuous suction.  MVI/

zinc/vitamin C were added to help promote wound healing along with Boost 

nutritional drinks for her hypoalbuminemia.  There was never any sign of 

superimposed infection of this wound while hospitalized.  She will follow-up 

with Dr. Ancelmo Uriostegui at [facility] on Monday, 8/27/18, as 

well as the [facility] on Tuesday, 8/28/18.   





2.  anemia - likely due to chronic disease - vitamin B12, folate were both 

normal.  Iron studies were most consistent with ACD but she might have an 

element of iron deficiency anemia as well.  


She received 2 units of PRBCs while hospitalized, and hemoglobin prior to 

discharge was 11.8.  She should remain on iron for a period of time.  





3.  c diff colitis - she received oral vancomycin her entire stay and 8/24/18 

was her 14th day of treatment.  Lactinex TID and Colestipol were used for 

symptomatic treatment.


Her stools were nearly normal by time of discharge.  Recommend 1 more week of 

colestipol along with probiotics.   





4.  severe anxiety - the patient had panic attacks and severe anxiety 

intermittently her entire stay.  I question if she has underlying depression in 

light of her recent health issues and prolonged hospital stay last month in 

[place].  She was initiated on buspar twice daily with low-dose xanax on a 

prn basis.  This provided good results for her.  Recommend counseling upon 

transfer to SNF and ultimately as an outpatient.  





5.  CKD stage 3 - creatinine ranged 1.1 to 1.3 while hospitalized.  





6.  UTI - on day of discharge the patient complained of mild dysuria.  U/a was 

suspicious for UTI.  Urine culture was sent on 8/24/18.  She was initiated on 

keflex 500mg BID for 7 days at that time.  Of note - the patient had had a CT 

of the abdomen & pelvis [facility], and failed 

to show any kidney stones or abnormalities of the urinary tract at that time.  





7.  severe deconditioning - the patient was seen by PT/OT while hospitalized.  

Rehab was recommended.  She will transfer to [facility] in [place], [place] for 

that purpose.  





Other medical problems including T2DM, asthma, etc remained stable while 

hospitalized. 














8/22/18 06:03








8/23/18 06:08








8/22/18 06:03








8/23/18 06:08








8/24/18 05:36

















Test


  8/21/18


20:58 8/22/18


06:03 8/22/18


07:18 8/22/18


11:18


 


Bedside Glucose


  225 mg/dl


(70-90) 


  126 mg/dl


(70-90) 131 mg/dl


(70-90)


 


Red Blood Count


  


  3.84 M/uL


(4.2-5.4) 


  


 


 


Mean Corpuscular Volume


  


  90.6 fL


() 


  


 


 


Mean Corpuscular Hemoglobin


  


  29.7 pg


(25-34) 


  


 


 


Mean Corpuscular Hemoglobin


Concent 


  32.8 g/dl


(32-36) 


  


 


 


RDW Standard Deviation


  


  52.6 fL


(36.4-46.3) 


  


 


 


RDW Coefficient of Variation


  


  16.5 %


(11.5-14.5) 


  


 


 


Mean Platelet Volume


  


  8.5 fL


(7.4-10.4) 


  


 


 


Anion Gap


  


  11.0 mmol/L


(3-11) 


  


 


 


Est Creatinine Clear Calc


Drug Dose 


  54.1 ml/min 


  


  


 


 


Estimated GFR (


American) 


  61.2 


  


  


 


 


Estimated GFR (Non-


American 


  52.8 


  


  


 


 


BUN/Creatinine Ratio  13.7 (10-20)   


 


Calcium Level


  


  8.8 mg/dl


(8.5-10.1) 


  


 


 


Magnesium Level


  


  2.3 mg/dl


(1.8-2.4) 


  


 


 


Troponin I


  


  0.043 ng/ml


(0-0.045) 


  


 


 


Test


  8/22/18


16:16 8/22/18


20:15 8/23/18


06:08 8/23/18


07:27


 


Bedside Glucose


  139 mg/dl


(70-90) 159 mg/dl


(70-90) 


  118 mg/dl


(70-90)


 


Red Blood Count


  


  


  4.04 M/uL


(4.2-5.4) 


 


 


Mean Corpuscular Volume


  


  


  91.3 fL


() 


 


 


Mean Corpuscular Hemoglobin


  


  


  29.2 pg


(25-34) 


 


 


Mean Corpuscular Hemoglobin


Concent 


  


  32.0 g/dl


(32-36) 


 


 


RDW Standard Deviation


  


  


  54.9 fL


(36.4-46.3) 


 


 


RDW Coefficient of Variation


  


  


  16.9 %


(11.5-14.5) 


 


 


Mean Platelet Volume


  


  


  8.2 fL


(7.4-10.4) 


 


 


Anion Gap


  


  


  9.0 mmol/L


(3-11) 


 


 


Est Creatinine Clear Calc


Drug Dose 


  


  50.9 ml/min 


  


 


 


Estimated GFR (


American) 


  


  57.5 


  


 


 


Estimated GFR (Non-


American 


  


  49.6 


  


 


 


BUN/Creatinine Ratio   13.8 (10-20)  


 


Calcium Level


  


  


  8.8 mg/dl


(8.5-10.1) 


 


 


Test


  8/23/18


11:13 8/23/18


16:30 8/23/18


20:32 8/24/18


05:36


 


Bedside Glucose


  150 mg/dl


(70-90) 131 mg/dl


(70-90) 201 mg/dl


(70-90) 


 


 


Anion Gap


  


  


  


  9.0 mmol/L


(3-11)


 


Est Creatinine Clear Calc


Drug Dose 


  


  


  41.7 ml/min 


 


 


Estimated GFR (


American) 


  


  


  50.7 


 


 


Estimated GFR (Non-


American 


  


  


  43.7 


 


 


BUN/Creatinine Ratio    15.8 (10-20) 


 


Calcium Level


  


  


  


  8.9 mg/dl


(8.5-10.1)


 


Magnesium Level


  


  


  


  2.4 mg/dl


(1.8-2.4)


 


Test


  8/24/18


07:22 8/24/18


11:15 8/24/18


11:30 


 


 


Bedside Glucose


  123 mg/dl


(70-90) 233 mg/dl


(70-90) 


  


 


 


Urine Color   DK YELLOW  


 


Urine Appearance   CLOUDY (CLEAR)  


 


Urine pH   7.5 (4.5-7.5)  


 


Urine Specific Gravity


  


  


  1.025


(1.000-1.030) 


 


 


Urine Protein   NEG (NEG)  


 


Urine Glucose (UA)   NEG (NEG)  


 


Urine Ketones   NEG (NEG)  


 


Urine Occult Blood   1+ (NEG)  


 


Urine Nitrite   NEG (NEG)  


 


Urine Bilirubin   NEG (NEG)  


 


Urine Urobilinogen   NEG (NEG)  


 


Urine Leukocyte Esterase   MODERATE (NEG)  


 


Urine WBC (Auto)   >30 /hpf (0-5)  


 


Urine RBC (Auto)   0-4 /hpf (0-4)  


 


Urine Hyaline Casts (Auto)   1-5 /lpf (0-5)  


 


Urine Epithelial Cells (Auto)   >30 /lpf (0-5)  


 


Urine Bacteria (Auto)   4+ (NEG)  


 


Urine Crystals


  


  


  TRIPLE


PHOSPHATE 


 


 


Urine Pathogenic Casts    /lpf (0)  








Total Time Spent:  Greater than 30 minutes


This includes examination of the patient, discharge planning, medication 

reconciliation, and communication with other providers.





Discharge Instructions


Please refer to the electronic Patient Visit Report (Discharge Instructions) 

for additional information.





Follow-Up


1.  Dr. Ancelmo Uriostegui, [facility] General Surgery - Monday, 

8/27/18 - for recheck of right thigh wound


2.  [facility] - Tuesday August 28th at 2:20PM


3.  Dr. Ever Roman, [facility] Cardiology, within 1-2 weeks


4.  Medical director of SNF within 48 hours 








CBC, BMP, and magnesium level recommended in 3 days for stability





Additional Copies To


Ever Roman MD; Jai Sandoval Brandon M. M.D.; Ancelmo Uriostegui M.D.

## 2018-08-24 NOTE — DISCHARGE INSTRUCTIONS
Discharge Instructions


Date of Service


Aug 24, 2018.





Admission


Reason for Admission:  Congestive Heart Failure





Discharge


Discharge Diagnosis / Problem:  Congestive Heart Failure - Improved.  Wound, 

right leg - stable.





Discharge Goals


Goal(s):  Decrease discomfort, Improve disease control, Learn about illness, 

Diagnostic testing, Therapeutic intervention





Activity Recommendations


Activity Level:  Assistance Required


Therapies:  Physical Therapy, Occupational Therapy


Ok to shower but keep right leg wound and wound vac clean/dry/intact.  


NO SUBMERSION IN A BATH OR POOL.  


.





Additional Information


Patient informed of condition:  Yes


Advance Directives:  No


DNR:  No


Level of Care:  Skilled


Communicable Disease:  No


Prognosis:  Stable


Oxygen at (LPM):  none


Sifuentes Catheter:  No





Instructions / Follow-Up


Instructions / Follow-Up





Follow-up appointments -





1.  see Dr. Ever Roman, [facility] - or one of his associates 

- within 1-2 weeks.





2.  see Dr. Ancelmo Uriostegui - [facility] in [place] [facility] 

- on Monday, 8/27/18 as scheduled





3.  see the [facility] within 1 week





4.  see the medical director of SNF within 48 hours of admission 











Congestive Heart Failure Instructions:





Call 911 and go to the Emergency Room if:





* You have tightness or pain in your chest that does not go away with rest 


   or Nitroglycerin


* You are very short of breath even with rest





Call your doctor if any of the following symptoms or problems start or


get worse:





* Shortness of breath or difficulty breathing


* Wake up at night short of breath


* Chest pain


* Cough


* Swelling of your hands, fee, or legs


* More fatigued or tired with your normal activity


* Palpitations - sudden fast heart beats





WEIGHT





* Weigh yourself every morning after using the bathroom.


* Use the same scale.


* Wear the same amount of clothing.


* Write your weight down on your chart.


* Call your doctor if you gain more than 2-3 pounds in 1-2 days.





MEDICATIONS





* Use this discharge instruction sheet for instructions.


* Take your medications at the time your doctor ordered.


* Do not skip a dose of your medicines.


* If you miss a dose of medicine, take as soon as possible, but DO NOT 


   DOUBLE A DOSE.


* Read your medicine information when you get home.


* Know all of the side effects of your medicine.


* Call your doctor's office if you have any side effects.





* Be sure all of your doctors know what medicine and herbs you take 


  (including cold, flu, and herbal medicine).





* Pain Medicine: If you do not get relief from your pain, please call your


   doctor for help.








Take the following with you to your follow-up doctor appointments:





* Weight Chart


* Medication List


* List of questions





Do not drink excessive alcohol, beer or wine.





Current Hospital Diet


Patient's current hospital diet: Diabetes Type 2 Diet, Low Sodium Diet (2gm Na)





Discharge Diet


Recommended Diet:  Low Sodium Diet (2gm Na), Diabetes Type 2 Diet


Fluid Restriction:  1500 ml (6 cups)





Procedures


Procedures Performed:  


1.  Right leg doppler ultrasound negative for DVT.


2.  Echocardiogram with ejection fraction of 35% and grade 2 diastolic


dysfunction.


3.  Heart catheterization with severe CAD but cath findings UNCHANGED from


prior cath (Ever Roman MD).





Pending Studies


Studies pending at discharge:  no





Physician Orders On Transfer


Special Precautions:


contact


Dressing Changes:


Woundvac to right groin/right thigh wound - continuous mode.  


Only to be changed by the [facility] [place] OR by her surgeons at 

[facility].


IV Therapy:


none


Vital Signs:


per routine


Weigh:


EVERY MORNING ON STANDING SCALE.





NOTIFY MEDICAL DIRECTOR OF ANY WEIGHT GAIN OF MORE THAN 2-3 POUNDS IN 1-2 DAYS.





PT'S WEIGHT ON DAY OF DISCHARGE (8/24/18) AT [facility] - 197 POUNDS.


Additional Orders:


CBC, BMP, MAGNESIUM LEVEL IN 3 DAYS FOR STABILITY, THEN 1 WEEK AFTER THAT, AND 

THEN AT THE DISCRETION OF HER PHYSICIANS.  





Fingerstick blood sugars - at mealtime and at bedtime.


POLST Discussion:  Not Applicable





Laboratory Results


8/22/18 06:03








8/23/18 06:08








8/22/18 06:03








8/23/18 06:08








8/24/18 05:36

















Test


  8/21/18


16:19 8/21/18


20:58 8/22/18


06:03 8/22/18


07:18


 


Bedside Glucose


  123 mg/dl


(70-90) 225 mg/dl


(70-90) 


  126 mg/dl


(70-90)


 


Red Blood Count


  


  


  3.84 M/uL


(4.2-5.4) 


 


 


Mean Corpuscular Volume


  


  


  90.6 fL


() 


 


 


Mean Corpuscular Hemoglobin


  


  


  29.7 pg


(25-34) 


 


 


Mean Corpuscular Hemoglobin


Concent 


  


  32.8 g/dl


(32-36) 


 


 


RDW Standard Deviation


  


  


  52.6 fL


(36.4-46.3) 


 


 


RDW Coefficient of Variation


  


  


  16.5 %


(11.5-14.5) 


 


 


Mean Platelet Volume


  


  


  8.5 fL


(7.4-10.4) 


 


 


Anion Gap


  


  


  11.0 mmol/L


(3-11) 


 


 


Est Creatinine Clear Calc


Drug Dose 


  


  54.1 ml/min 


  


 


 


Estimated GFR (


American) 


  


  61.2 


  


 


 


Estimated GFR (Non-


American 


  


  52.8 


  


 


 


BUN/Creatinine Ratio   13.7 (10-20)  


 


Calcium Level


  


  


  8.8 mg/dl


(8.5-10.1) 


 


 


Magnesium Level


  


  


  2.3 mg/dl


(1.8-2.4) 


 


 


Troponin I


  


  


  0.043 ng/ml


(0-0.045) 


 


 


Test


  8/22/18


11:18 8/22/18


16:16 8/22/18


20:15 8/23/18


06:08


 


Bedside Glucose


  131 mg/dl


(70-90) 139 mg/dl


(70-90) 159 mg/dl


(70-90) 


 


 


Red Blood Count


  


  


  


  4.04 M/uL


(4.2-5.4)


 


Mean Corpuscular Volume


  


  


  


  91.3 fL


()


 


Mean Corpuscular Hemoglobin


  


  


  


  29.2 pg


(25-34)


 


Mean Corpuscular Hemoglobin


Concent 


  


  


  32.0 g/dl


(32-36)


 


RDW Standard Deviation


  


  


  


  54.9 fL


(36.4-46.3)


 


RDW Coefficient of Variation


  


  


  


  16.9 %


(11.5-14.5)


 


Mean Platelet Volume


  


  


  


  8.2 fL


(7.4-10.4)


 


Anion Gap


  


  


  


  9.0 mmol/L


(3-11)


 


Est Creatinine Clear Calc


Drug Dose 


  


  


  50.9 ml/min 


 


 


Estimated GFR (


American) 


  


  


  57.5 


 


 


Estimated GFR (Non-


American 


  


  


  49.6 


 


 


BUN/Creatinine Ratio    13.8 (10-20) 


 


Calcium Level


  


  


  


  8.8 mg/dl


(8.5-10.1)


 


Test


  8/23/18


07:27 8/23/18


11:13 8/23/18


16:30 8/23/18


20:32


 


Bedside Glucose


  118 mg/dl


(70-90) 150 mg/dl


(70-90) 131 mg/dl


(70-90) 201 mg/dl


(70-90)


 


Test


  8/24/18


05:36 8/24/18


07:22 8/24/18


11:15 8/24/18


11:40


 


Anion Gap


  9.0 mmol/L


(3-11) 


  


  


 


 


Est Creatinine Clear Calc


Drug Dose 41.7 ml/min 


  


  


  


 


 


Estimated GFR (


American) 50.7 


  


  


  


 


 


Estimated GFR (Non-


American 43.7 


  


  


  


 


 


BUN/Creatinine Ratio 15.8 (10-20)    


 


Calcium Level


  8.9 mg/dl


(8.5-10.1) 


  


  


 


 


Magnesium Level


  2.4 mg/dl


(1.8-2.4) 


  


  


 


 


Bedside Glucose


  


  123 mg/dl


(70-90) 233 mg/dl


(70-90) 


 











Medical Emergencies








.


Who to Call and When:





Medical Emergencies:  If at any time you feel your situation is an emergency, 

please call 911 immediately.





.





Non-Emergent Contact


Non-Emergency issues call your:  Primary Care Provider, Cardiologist, Surgeon


Call Non-Emergent contact if:  temperature is above 100.5, your pain is not 

controlled, your pain is worsening, your pain is unusual for you, your pain is 

concerning you, wound has increased drainage, wound has increased redness, 

wound has increased pain, you have any medication questions





.


.








"Provider Documentation" section prepared by Juan Mcarthur.








.





Core Measure Problem


Core Measures:  None